# Patient Record
Sex: MALE | Race: WHITE | Employment: OTHER | URBAN - METROPOLITAN AREA
[De-identification: names, ages, dates, MRNs, and addresses within clinical notes are randomized per-mention and may not be internally consistent; named-entity substitution may affect disease eponyms.]

---

## 2019-04-18 ENCOUNTER — HISTORICAL (OUTPATIENT)
Dept: ADMINISTRATIVE | Facility: HOSPITAL | Age: 64
End: 2019-04-18

## 2019-04-18 LAB
ABS NEUT (OLG): 4.7 X10(3)/MCL (ref 2.1–9.2)
ALBUMIN SERPL-MCNC: 4.7 GM/DL (ref 3.4–5)
ALBUMIN/GLOB SERPL: 1.62 {RATIO} (ref 1.5–2.5)
ALP SERPL-CCNC: 61 UNIT/L (ref 38–126)
ALT SERPL-CCNC: 23 UNIT/L (ref 7–52)
AST SERPL-CCNC: 22 UNIT/L (ref 15–37)
BILIRUB SERPL-MCNC: 0.8 MG/DL (ref 0.2–1)
BILIRUBIN DIRECT+TOT PNL SERPL-MCNC: 0.2 MG/DL (ref 0–0.5)
BILIRUBIN DIRECT+TOT PNL SERPL-MCNC: 0.6 MG/DL
BUN SERPL-MCNC: 11 MG/DL (ref 7–18)
CALCIUM SERPL-MCNC: 9.6 MG/DL (ref 8.5–10)
CHLORIDE SERPL-SCNC: 105 MMOL/L (ref 98–107)
CHOLEST SERPL-MCNC: 227 MG/DL (ref 0–200)
CHOLEST/HDLC SERPL: 4.4 {RATIO}
CO2 SERPL-SCNC: 28 MMOL/L (ref 21–32)
CREAT SERPL-MCNC: 0.99 MG/DL (ref 0.6–1.3)
ERYTHROCYTE [DISTWIDTH] IN BLOOD BY AUTOMATED COUNT: 12.9 % (ref 11.5–17)
GLOBULIN SER-MCNC: 2.9 GM/DL (ref 1.2–3)
GLUCOSE SERPL-MCNC: 119 MG/DL (ref 74–106)
HCT VFR BLD AUTO: 45.2 % (ref 42–52)
HDLC SERPL-MCNC: 52 MG/DL (ref 35–60)
HGB BLD-MCNC: 15.9 GM/DL (ref 14–18)
LDLC SERPL CALC-MCNC: 142 MG/DL (ref 0–129)
LYMPHOCYTES # BLD AUTO: 2.3 X10(3)/MCL (ref 0.6–3.4)
LYMPHOCYTES NFR BLD AUTO: 30.3 % (ref 13–40)
MCH RBC QN AUTO: 31.1 PG (ref 27–31.2)
MCHC RBC AUTO-ENTMCNC: 35 GM/DL (ref 32–36)
MCV RBC AUTO: 88 FL (ref 80–94)
MONOCYTES # BLD AUTO: 0.6 X10(3)/MCL (ref 0.1–1.3)
MONOCYTES NFR BLD AUTO: 7.7 % (ref 0.1–24)
NEUTROPHILS NFR BLD AUTO: 62 % (ref 47–80)
PLATELET # BLD AUTO: 219 X10(3)/MCL (ref 130–400)
PMV BLD AUTO: 10 FL (ref 9.4–12.4)
POTASSIUM SERPL-SCNC: 4.2 MMOL/L (ref 3.5–5.1)
PROT SERPL-MCNC: 7.6 GM/DL (ref 6.4–8.2)
PSA SERPL-MCNC: 11.55 NG/ML (ref 0–4.5)
RBC # BLD AUTO: 5.11 X10(6)/MCL (ref 4.7–6.1)
SODIUM SERPL-SCNC: 140 MMOL/L (ref 136–145)
TRIGL SERPL-MCNC: 110 MG/DL (ref 30–150)
TSH SERPL-ACNC: 1.05 MIU/ML (ref 0.35–4.94)
VLDLC SERPL CALC-MCNC: 22 MG/DL
WBC # SPEC AUTO: 7.6 X10(3)/MCL (ref 4.5–11.5)

## 2021-06-01 ENCOUNTER — HISTORICAL (OUTPATIENT)
Dept: ADMINISTRATIVE | Facility: HOSPITAL | Age: 66
End: 2021-06-01

## 2022-04-07 ENCOUNTER — HISTORICAL (OUTPATIENT)
Dept: ADMINISTRATIVE | Facility: HOSPITAL | Age: 67
End: 2022-04-07

## 2022-04-24 VITALS
WEIGHT: 174.81 LBS | DIASTOLIC BLOOD PRESSURE: 88 MMHG | BODY MASS INDEX: 28.09 KG/M2 | HEIGHT: 66 IN | SYSTOLIC BLOOD PRESSURE: 164 MMHG

## 2022-05-01 NOTE — HISTORICAL [facility] CERNER
This is a historical note converted from [employer]. Formatting and pictures may have been removed.  Please reference [employer] for original formatting and attached multimedia. Chief Complaint  WELLNESS CPX FAST  History of Present Illness  64 year old WM presents fasting for annual wellness  PMH: HTN  ?  , Works for [employer]  No Tob, No EtOH  No exercise  Diet: No fried/fatty foods  ?  Colonoscopy (2017)- few polyps  ?  Previously on Losartan for HTN but patient states he hasnt taken med for 1-2 years because My BP is always normal at home.  Review of Systems  Constitutional:?no fever, fatigue, weakness  Eye:?no vision loss, eye redness, drainage, or pain  ENMT:?no sore throat, ear pain, sinus pain/congestion, nasal congestion/drainage  Respiratory:?no cough, no wheezing, no shortness of breath  Cardiovascular:?no chest pain, no palpitations, no edema  Gastrointestinal:?no nausea, vomiting, or diarrhea. No abdominal pain  Genitourinary:?no dysuria, no urinary frequency or urgency, no hematuria  Hema/Lymph:?no abnormal bruising or bleeding  Endocrine:?no heat or cold intolerance, no excessive thirst or excessive urination  Musculoskeletal:?no muscle or joint pain, no joint swelling  Integumentary:?no skin rash or abnormal lesion  Neurologic: no headache, no dizziness, no weakness or numbness  Physical Exam  Vitals & Measurements  T:?37? ?C (Oral)? HR:?68(Peripheral)? BP:?168/98?  HT:?167?cm? WT:?80.7?kg? BMI:?28.94?  General:?well-developed well-nourished in no acute distress  Eye: PERRLA, EOMI, clear conjunctiva, eyelids normal  HENT:?TMs/ear canals clear, oropharynx without erythema/exudate, oropharynx and nasal mucosal surfaces moist, no maxillary/frontal sinus tenderness to palpation  Neck: full range of motion, no thyromegaly or lymphadenopathy  Respiratory:?clear to auscultation bilaterally  Cardiovascular:?regular rate and rhythm without murmurs, gallops or rubs  Gastrointestinal:?soft, non-tender,  non-distended with normal bowel sounds, without masses to palpation  Genitourinary: no CVA tenderness to palpation  Musculoskeletal:?full range of motion of all extremities/spine without limitation or discomfort  Integumentary: no rashes or skin lesions present  Neurologic: cranial nerves intact, no signs of peripheral neurological deficit, motor/sensory function intact  Assessment/Plan  1.?Wellness examination?Z00.00  ?LABS: CBC, CMP, TSH, FLP  Ordered:  CBC w/ Auto Diff, Routine collect, 04/18/19 8:39:00 CDT, Blood, Order for future visit, Stop date 04/18/19 8:39:00 CDT, Lab Collect, Wellness examination, 04/18/19 8:39:00 CDT  Comprehensive Metabolic Panel, Now collect, 04/18/19 8:39:00 CDT, Blood, Order for future visit, Stop date 04/18/19 8:39:00 CDT, Lab Collect, Wellness examination, 04/18/19 8:39:00 CDT  Lab Collection Request, 04/18/19 8:39:00 CDT, [facility] - [facility], 04/18/19 8:39:00 CDT  Lipid Panel, Routine collect, 04/18/19 8:39:00 CDT, Blood, Order for future visit, Stop date 04/18/19 8:39:00 CDT, Lab Collect, Wellness examination, 04/18/19 8:39:00 CDT  Preventative Health Care Est 40-64 years [id number] PC, Wellness examination, [facility] - [facility], 04/18/19 8:39:00 CDT  Thyroid Stimulating Hormone, Routine collect, 04/18/19 8:39:00 CDT, Blood, Order for future visit, Stop date 04/18/19 8:39:00 CDT, Lab Collect, Wellness examination, 04/18/19 8:39:00 CDT  ?  2.?Depression?F32.9  ?  3.?HTN - Hypertension?I10  ?Advised to monitor BP twice daily at home x 2 weeks  ?Call us in 2 weeks with BP log to review. Will decide on whether to restart Losartan pending results  ?  4.?Hyperlipidemia?E78.5  ?  5.?Prostate cancer screening?Z12.5  Ordered:  Prostate Specific Antigen, Routine collect, 04/18/19 8:39:00 CDT, Blood, Order for future visit, Stop date 04/18/19 8:39:00 CDT, Lab Collect, Prostate cancer screening, 04/18/19 8:39:00 CDT  ?  Orders:  Clinic Follow-up PRN, 04/18/19 8:40:00 CDT, [facility] - [facility], Future Order    Problem List/Past Medical History  Ongoing  Depression  Enlarged prostate  HTN - Hypertension  Hyperlipidemia  IBS - Irritable bowel syndrome  Polyp  Historical  No qualifying data  Procedure/Surgical History  colonoscopy (08/02/2017)  Biopsy Prostate (None) (11/07/2016)  Excision of Prostate, Percutaneous Endoscopic Approach, Diagnostic (11/07/2016)  Unlisted procedure, male genital system (11/07/2016)  Colonoscopy (07/13/2010)  Biopsy of prostate   Medications  cannabidiol, 350 mg, Oral, BID  CoQ10 300 mg oral capsule, 300 mg= 1 cap(s), Oral, Daily  MagneBind 400 Rx oral tablet, 1 tab(s), Oral, Daily  multivitamin with minerals (Adult Tab), 1 tab(s), Oral, Daily  Allergies  No Known Allergies  Social History  Alcohol  Current, 1-2 times per month, 04/18/2019  Employment/School  Retired, 04/18/2019  Home/Environment  Lives with Spouse., 04/18/2019  Nutrition/Health  Regular, 04/18/2019  Substance Abuse  Never, 04/18/2019  Tobacco  Never (less than 100 in lifetime), N/A, 04/18/2019  Family History  Asthma.: Sister.Negative: Mother.  COPD (chronic obstructive pulmonary disease).: Father.  Cancer: Father.  Depression.: Mother.Negative: Father.  Diabetes mellitus 27-APR-2016 23:37:36<$>: Mother.  Diabetes mellitus type 2: Mother and Grandfather.  Hypertension.: Mother, Father and Brother.  Osteoarthritis: Brother.  Primary malignant neoplasm of prostate: Grandfather.  Thyroid disorder: Sister.  Health Maintenance  Health Maintenance  ???Pending?(in the next year)  ??? ??Due?  ??? ? ? ?ADL Screening due??04/18/19??and every 1??year(s)  ??? ? ? ?Alcohol Misuse Screening due??04/18/19??and every 1??year(s)  ??? ? ? ?Aspirin Therapy for CVD Prevention due??04/18/19??and every 1??year(s)  ??? ? ? ?COLONOSCOPY due??04/18/19??and every 5??year(s)  ??? ? ? ?Hypertension Management-Education due??04/18/19??and every 1??year(s)  ??? ? ? ?Hypertension Maintenance-Medication Prescribed due??04/18/19??and every 1??year(s)  ??? ? ?  ?Smoking Cessation due??04/18/19??Variable frequency  ??? ? ? ?Tetanus Vaccine due??04/18/19??and every 10??year(s)  ??? ? ? ?Zoster Vaccine due??04/18/19??and every 100??year(s)  ???Satisfied?(in the past 1 year)  ??? ??Satisfied?  ??? ? ? ?Blood Pressure Screening on??04/18/19.??Satisfied by Beronica Rosario LPN  ??? ? ? ?Body Mass Index Check on??04/18/19.??Satisfied by Beronica Rosario LPN  ??? ? ? ?Depression Screening on??04/18/19.??Satisfied by Beronica Rosario LPN  ??? ? ? ?Hypertension Management-Blood Pressure on??04/18/19.??Satisfied by Beronica Rosario LPN  ??? ? ? ?Obesity Screening on??04/18/19.??Satisfied by Beronica Rosario LPN  ?  ?

## 2023-01-18 ENCOUNTER — TELEPHONE (OUTPATIENT)
Dept: PRIMARY CARE CLINIC | Facility: CLINIC | Age: 68
End: 2023-01-18
Payer: MEDICARE

## 2023-01-18 NOTE — TELEPHONE ENCOUNTER
Spoke with pt's wife and discussed how no labs are ordered on new pts. Pt had no questions or concerns

## 2023-01-18 NOTE — TELEPHONE ENCOUNTER
----- Message from Melly Garcia sent at 1/18/2023  8:32 AM CST -----  Regarding: Lab orders  .Type:  Needs Medical Advice    Who Called: Pt's wife, Jenn  Symptoms (please be specific):   How long has patient had these symptoms:    Pharmacy name and phone #:    Would the patient rather a call back or a response via [facility]? Call back  Best Call Back Number: [phone]  Additional Information: Pt requesting labs prior to appt. Pls F/U

## 2023-04-05 PROBLEM — E78.5 DYSLIPIDEMIA: Chronic | Status: ACTIVE | Noted: 2023-04-05

## 2023-04-05 PROBLEM — F32.A DEPRESSIVE DISORDER: Chronic | Status: ACTIVE | Noted: 2023-04-05

## 2023-04-05 PROBLEM — I10 PRIMARY HYPERTENSION: Chronic | Status: ACTIVE | Noted: 2023-04-05

## 2023-04-05 PROBLEM — I10 HYPERTENSION: Status: ACTIVE | Noted: 2023-04-05

## 2023-04-05 PROBLEM — N40.0 BENIGN PROSTATIC HYPERPLASIA WITHOUT LOWER URINARY TRACT SYMPTOMS: Chronic | Status: ACTIVE | Noted: 2023-04-05

## 2023-04-05 PROBLEM — K58.9 IRRITABLE BOWEL SYNDROME: Status: ACTIVE | Noted: 2023-04-05

## 2023-04-05 PROBLEM — F32.A DEPRESSIVE DISORDER: Status: ACTIVE | Noted: 2023-04-05

## 2023-04-05 PROBLEM — K58.9 IRRITABLE BOWEL SYNDROME: Chronic | Status: ACTIVE | Noted: 2023-04-05

## 2023-04-05 PROBLEM — N40.0 BENIGN PROSTATIC HYPERPLASIA WITHOUT LOWER URINARY TRACT SYMPTOMS: Status: ACTIVE | Noted: 2023-04-05

## 2023-04-05 PROBLEM — E78.5 HYPERLIPIDEMIA: Status: ACTIVE | Noted: 2023-04-05

## 2023-04-06 ENCOUNTER — OFFICE VISIT (OUTPATIENT)
Dept: PRIMARY CARE CLINIC | Facility: CLINIC | Age: 68
End: 2023-04-06
Payer: COMMERCIAL

## 2023-04-06 VITALS
HEIGHT: 65 IN | OXYGEN SATURATION: 98 % | DIASTOLIC BLOOD PRESSURE: 81 MMHG | SYSTOLIC BLOOD PRESSURE: 113 MMHG | RESPIRATION RATE: 18 BRPM | BODY MASS INDEX: 29.66 KG/M2 | WEIGHT: 178 LBS | HEART RATE: 99 BPM

## 2023-04-06 DIAGNOSIS — Z76.89 ESTABLISHING CARE WITH NEW DOCTOR, ENCOUNTER FOR: Primary | ICD-10-CM

## 2023-04-06 DIAGNOSIS — Z00.00 WELLNESS EXAMINATION: ICD-10-CM

## 2023-04-06 DIAGNOSIS — I10 PRIMARY HYPERTENSION: Chronic | ICD-10-CM

## 2023-04-06 DIAGNOSIS — R97.20 ELEVATED PSA, BETWEEN 10 AND LESS THAN 20 NG/ML: Chronic | ICD-10-CM

## 2023-04-06 DIAGNOSIS — R79.9 ABNORMAL BLOOD CHEMISTRY: ICD-10-CM

## 2023-04-06 PROBLEM — K57.30 DIVERTICULOSIS OF LARGE INTESTINE WITHOUT PERFORATION OR ABSCESS WITHOUT BLEEDING: Chronic | Status: ACTIVE | Noted: 2022-10-20

## 2023-04-06 PROBLEM — Z86.010 PERSONAL HISTORY OF COLONIC POLYPS: Chronic | Status: ACTIVE | Noted: 2017-07-31

## 2023-04-06 PROBLEM — Z86.010 PERSONAL HISTORY OF COLONIC POLYPS: Status: ACTIVE | Noted: 2017-07-31

## 2023-04-06 PROBLEM — E78.5 DYSLIPIDEMIA: Chronic | Status: RESOLVED | Noted: 2023-04-05 | Resolved: 2023-04-06

## 2023-04-06 PROBLEM — Z86.0100 PERSONAL HISTORY OF COLONIC POLYPS: Chronic | Status: ACTIVE | Noted: 2017-07-31

## 2023-04-06 PROBLEM — K64.8 OTHER HEMORRHOIDS: Status: ACTIVE | Noted: 2017-08-02

## 2023-04-06 PROBLEM — K64.8 OTHER HEMORRHOIDS: Chronic | Status: ACTIVE | Noted: 2017-08-02

## 2023-04-06 PROBLEM — K57.30 DIVERTICULOSIS OF LARGE INTESTINE WITHOUT PERFORATION OR ABSCESS WITHOUT BLEEDING: Status: ACTIVE | Noted: 2022-10-20

## 2023-04-06 PROBLEM — Z86.0100 PERSONAL HISTORY OF COLONIC POLYPS: Status: ACTIVE | Noted: 2017-07-31

## 2023-04-06 PROBLEM — K58.9 IRRITABLE BOWEL SYNDROME: Chronic | Status: RESOLVED | Noted: 2023-04-05 | Resolved: 2023-04-06

## 2023-04-06 PROCEDURE — 99213 PR OFFICE/OUTPT VISIT, EST, LEVL III, 20-29 MIN: ICD-10-PCS | Mod: ,,, | Performed by: GENERAL PRACTICE

## 2023-04-06 PROCEDURE — 99213 OFFICE O/P EST LOW 20 MIN: CPT | Mod: ,,, | Performed by: GENERAL PRACTICE

## 2023-04-06 NOTE — PROGRESS NOTES
"Subjective:       Patient ID: Jd Nelson is a 67 y.o. male.    Chief Complaint: Establish Care (Left sided hip pain x 1 year and weakness)    Patient presents to the clinic to establish primary care.  Past medical, family, and social history is reviewed, as well as all chronic conditions, and medications prescribed to treat those conditions.      Review of Systems   Constitutional: Negative.  Negative for fatigue and fever.   HENT: Negative.  Negative for sore throat and trouble swallowing.    Eyes: Negative.  Negative for redness and visual disturbance.   Respiratory: Negative.  Negative for chest tightness and shortness of breath.    Cardiovascular: Negative.  Negative for chest pain.   Gastrointestinal: Negative.  Negative for abdominal pain, blood in stool and nausea.   Endocrine: Negative.  Negative for cold intolerance, heat intolerance and polyuria.   Genitourinary: Negative.    Musculoskeletal: Negative.  Negative for arthralgias, gait problem and joint swelling.   Integumentary:  Negative for rash. Negative.   Neurological: Negative.  Negative for dizziness, weakness and headaches.   Psychiatric/Behavioral: Negative.  Negative for agitation and dysphoric mood.        Objective:     Vitals:    04/06/23 1306   BP: 113/81   Pulse: 99   Resp: 18   SpO2: 98%   Weight: 80.7 kg (178 lb)   Height: 5' 5" (1.651 m)   Body mass index is 29.62 kg/m².     Physical Exam  Constitutional:       Appearance: Normal appearance.   HENT:      Head: Normocephalic.      Mouth/Throat:      Pharynx: Oropharynx is clear.   Eyes:      Conjunctiva/sclera: Conjunctivae normal.      Pupils: Pupils are equal, round, and reactive to light.   Cardiovascular:      Rate and Rhythm: Normal rate and regular rhythm.      Heart sounds: Normal heart sounds.   Pulmonary:      Effort: Pulmonary effort is normal.      Breath sounds: Normal breath sounds.   Abdominal:      General: Abdomen is flat.      Palpations: Abdomen is soft. "   Musculoskeletal:         General: Normal range of motion.      Cervical back: Neck supple.   Skin:     General: Skin is warm and dry.   Neurological:      General: No focal deficit present.      Mental Status: He is oriented to person, place, and time.   Psychiatric:         Mood and Affect: Mood normal.         Behavior: Behavior normal.         Thought Content: Thought content normal.         Judgment: Judgment normal.         Lab Results   Component Value Date    .0 04/18/2019    K 4.2 04/18/2019    CO2 28.0 04/18/2019    BUN 11.0 04/18/2019    CREATININE 0.99 04/18/2019    CALCIUM 9.6 04/18/2019    ALBUMIN 4.7 04/18/2019    BILITOT 0.8 04/18/2019    ALKPHOS 61.0 04/18/2019    AST 22.0 04/18/2019    ALT 23.0 04/18/2019    EGFRNONAA >60 04/18/2019     Lab Results   Component Value Date    WBC 7.6 04/18/2019    RBC 5.11 04/18/2019    HGB 15.9 04/18/2019    HCT 45.2 04/18/2019    MCV 88 04/18/2019    RDW 12.9 04/18/2019     04/18/2019      Lab Results   Component Value Date    CHOL 227.0 (H) 04/18/2019    TRIG 110.0 04/18/2019    HDL 52.0 04/18/2019    .0 (H) 04/18/2019    TOTALCHOLEST 4.4 04/18/2019     Lab Results   Component Value Date    TSH 1.050 04/18/2019     No results found for: HGBA1C, ESTIMATEDAVG     Lab Results   Component Value Date    PSA 11.55 (H) 04/18/2019         Assessment:     Problem List Items Addressed This Visit          Cardiac/Vascular    Primary hypertension (Chronic)    Current Assessment & Plan     Controlled without prescription medication.              Renal/    Elevated PSA, between 10 and less than 20 ng/ml (Chronic)    Current Assessment & Plan     Patient sees Dr. St, his urologist on a regular basis.            Other Visit Diagnoses       Establishing care with new doctor, encounter for    -  Primary    Patient's medical care has been established in this clinic.  All issues addressed, all questions answered,  with appropriate scheduling of follow-up  care.    Wellness examination        Relevant Orders    Lipid Panel    Comprehensive Metabolic Panel    Hemoglobin A1C    Hepatitis C Antibody    Abnormal blood chemistry        Relevant Orders    Hemoglobin A1C                 Plan:             Follow up in about 3 months (around 7/19/2023).

## 2023-07-19 DIAGNOSIS — R79.9 ABNORMAL BLOOD CHEMISTRY: ICD-10-CM

## 2023-07-19 DIAGNOSIS — R97.20 ELEVATED PSA, BETWEEN 10 AND LESS THAN 20 NG/ML: Chronic | ICD-10-CM

## 2023-07-19 DIAGNOSIS — Z00.00 WELLNESS EXAMINATION: Primary | ICD-10-CM

## 2023-07-20 ENCOUNTER — CLINICAL SUPPORT (OUTPATIENT)
Dept: PRIMARY CARE CLINIC | Facility: CLINIC | Age: 68
End: 2023-07-20
Payer: COMMERCIAL

## 2023-07-20 DIAGNOSIS — R79.9 ABNORMAL BLOOD CHEMISTRY: ICD-10-CM

## 2023-07-20 DIAGNOSIS — Z00.00 WELLNESS EXAMINATION: ICD-10-CM

## 2023-07-20 DIAGNOSIS — I10 PRIMARY HYPERTENSION: Primary | Chronic | ICD-10-CM

## 2023-07-20 LAB
ALBUMIN SERPL-MCNC: 4.4 G/DL (ref 3.4–4.8)
ALBUMIN/GLOB SERPL: 1.5 RATIO (ref 1.1–2)
ALP SERPL-CCNC: 55 UNIT/L (ref 40–150)
ALT SERPL-CCNC: 30 UNIT/L (ref 0–55)
AST SERPL-CCNC: 27 UNIT/L (ref 5–34)
BILIRUBIN DIRECT+TOT PNL SERPL-MCNC: 0.6 MG/DL
BUN SERPL-MCNC: 12.6 MG/DL (ref 8.4–25.7)
CALCIUM SERPL-MCNC: 9.8 MG/DL (ref 8.8–10)
CHLORIDE SERPL-SCNC: 106 MMOL/L (ref 98–107)
CHOLEST SERPL-MCNC: 227 MG/DL
CHOLEST/HDLC SERPL: 4 {RATIO} (ref 0–5)
CO2 SERPL-SCNC: 23 MMOL/L (ref 23–31)
CREAT SERPL-MCNC: 0.94 MG/DL (ref 0.73–1.18)
EST. AVERAGE GLUCOSE BLD GHB EST-MCNC: 105.4 MG/DL
GFR SERPLBLD CREATININE-BSD FMLA CKD-EPI: >60 MLS/MIN/1.73/M2
GLOBULIN SER-MCNC: 2.9 GM/DL (ref 2.4–3.5)
GLUCOSE SERPL-MCNC: 108 MG/DL (ref 82–115)
HBA1C MFR BLD: 5.3 %
HDLC SERPL-MCNC: 51 MG/DL (ref 35–60)
LDLC SERPL CALC-MCNC: 151 MG/DL (ref 50–140)
POTASSIUM SERPL-SCNC: 4.3 MMOL/L (ref 3.5–5.1)
PROT SERPL-MCNC: 7.3 GM/DL (ref 5.8–7.6)
SODIUM SERPL-SCNC: 139 MMOL/L (ref 136–145)
TRIGL SERPL-MCNC: 126 MG/DL (ref 34–140)
VLDLC SERPL CALC-MCNC: 25 MG/DL

## 2023-07-20 PROCEDURE — 36415 COLL VENOUS BLD VENIPUNCTURE: CPT | Mod: ,,, | Performed by: GENERAL PRACTICE

## 2023-07-20 PROCEDURE — 80053 COMPREHEN METABOLIC PANEL: CPT | Performed by: GENERAL PRACTICE

## 2023-07-20 PROCEDURE — 80061 LIPID PANEL: CPT | Performed by: GENERAL PRACTICE

## 2023-07-20 PROCEDURE — 86803 HEPATITIS C AB TEST: CPT | Performed by: GENERAL PRACTICE

## 2023-07-20 PROCEDURE — 36415 PR COLLECTION VENOUS BLOOD,VENIPUNCTURE: ICD-10-PCS | Mod: ,,, | Performed by: GENERAL PRACTICE

## 2023-07-20 PROCEDURE — 83036 HEMOGLOBIN GLYCOSYLATED A1C: CPT | Performed by: GENERAL PRACTICE

## 2023-07-20 PROCEDURE — 36415 COLL VENOUS BLD VENIPUNCTURE: CPT

## 2023-07-21 LAB — HCV AB SERPL QL IA: NONREACTIVE

## 2023-07-26 ENCOUNTER — OFFICE VISIT (OUTPATIENT)
Dept: PRIMARY CARE CLINIC | Facility: CLINIC | Age: 68
End: 2023-07-26
Payer: COMMERCIAL

## 2023-07-26 VITALS
DIASTOLIC BLOOD PRESSURE: 86 MMHG | WEIGHT: 174 LBS | OXYGEN SATURATION: 97 % | BODY MASS INDEX: 28.99 KG/M2 | SYSTOLIC BLOOD PRESSURE: 138 MMHG | HEIGHT: 65 IN | RESPIRATION RATE: 18 BRPM | HEART RATE: 101 BPM

## 2023-07-26 DIAGNOSIS — R97.20 ELEVATED PSA, BETWEEN 10 AND LESS THAN 20 NG/ML: Chronic | ICD-10-CM

## 2023-07-26 DIAGNOSIS — Z00.00 MEDICARE ANNUAL WELLNESS VISIT, SUBSEQUENT: Primary | ICD-10-CM

## 2023-07-26 DIAGNOSIS — N40.0 BENIGN PROSTATIC HYPERPLASIA WITHOUT LOWER URINARY TRACT SYMPTOMS: Chronic | ICD-10-CM

## 2023-07-26 DIAGNOSIS — R79.9 ABNORMAL BLOOD CHEMISTRY: ICD-10-CM

## 2023-07-26 DIAGNOSIS — E78.5 DYSLIPIDEMIA: Chronic | ICD-10-CM

## 2023-07-26 PROBLEM — I10 PRIMARY HYPERTENSION: Chronic | Status: RESOLVED | Noted: 2023-04-05 | Resolved: 2023-07-26

## 2023-07-26 PROCEDURE — G0439 PR MEDICARE ANNUAL WELLNESS SUBSEQUENT VISIT: ICD-10-PCS | Mod: ,,, | Performed by: GENERAL PRACTICE

## 2023-07-26 PROCEDURE — G0439 PPPS, SUBSEQ VISIT: HCPCS | Mod: ,,, | Performed by: GENERAL PRACTICE

## 2023-07-26 NOTE — PROGRESS NOTES
Patient ID: [id number]     Chief Complaint: Annual Exam      HPI:     Jd Nelson is a 68 y.o. male here today for a Medicare Wellness. No other complaints today.       ----------------------------  Dyslipidemia  Irritable bowel syndrome  Primary hypertension     History reviewed. No pertinent surgical history.    Review of patient's allergies indicates:  No Known Allergies    No outpatient medications have been marked as taking for the 7/26/23 encounter (Office Visit) with Galo Hussein MD.       Social History     Socioeconomic History    Marital status:    Tobacco Use    Smoking status: Never    Smokeless tobacco: Never        History reviewed. No pertinent family history.     Patient Care Team:  Galo Hussein MD as PCP - General (Family Medicine)  Joao St MD as Consulting Physician (Urology)  Enoc Gaytan MD as Consulting Physician (Gastroenterology)     Opioid Screening: Patient medication list reviewed, patient is not taking prescription opioids. Patient is not using additional opioids than prescribed. Patient is at low risk of substance abuse based on this opioid use history.       Subjective:     Review of Systems   Constitutional: Negative.  Negative for malaise/fatigue and weight loss.   HENT: Negative.     Eyes: Negative.    Respiratory: Negative.  Negative for shortness of breath.    Cardiovascular: Negative.  Negative for chest pain.   Gastrointestinal: Negative.    Genitourinary: Negative.    Musculoskeletal: Negative.    Skin: Negative.    Neurological: Negative.  Negative for focal weakness, weakness and headaches.   Endo/Heme/Allergies: Negative.    Psychiatric/Behavioral: Negative.  Negative for depression and memory loss. The patient is not nervous/anxious.        Patient Reported Health Risk Assessment  What [age] your age?: 65-69  Are you male or female?: Male  During the past four weeks, how much have you been bothered by emotional problems such as feeling  anxious, depressed, irritable, sad, or downhearted and blue?: Not at all  During the past five weeks, has your physical and/or emotional health limited your social activities with family, friends, neighbors, or groups?: Not at all  During the past four weeks, how much bodily pain have you generally had?: Very mild pain  During the past four weeks, was someone available to help if you needed and wanted help?: Yes, as much as I wanted  During the past four weeks, what was the hardest physical activity you could do for at least two minutes?: Light  Can you get to places out of walking distance without help?  (For example, can you travel alone on buses or taxis, or drive your own car?): Yes  Can you go shopping for groceries or clothes without someone's help?: Yes  Can you prepare your own meals?: Yes  Can you do your own housework without help?: Yes  Because of any health problems, do you need the help of another person with your personal care needs such as eating, bathing, dressing, or getting around the house?: No  Can you handle your own money without help?: Yes  During the past four weeks, how would you rate your health in general?: Good  How have things been going for you during the past four weeks?: Pretty well  Are you having difficulties driving your car?: No  Do you always fasten your seat belt when you are in a car?: Yes, usually  How often in the past four weeks have you been bothered by falling or dizzy when standing up?: Never  How often in the past four weeks have you been bothered by sexual problems?: Never  How often in the past four weeks have you been bothered by trouble eating well?: Never  How often in the past four weeks have you been bothered by teeth or denture problems?: Never  How often in the past four weeks have you been bothered with problems using the telephone?: Never  How often in the past four weeks have you been bothered by tiredness or fatigue?: Never  Have you fallen two or more times  "in the past year?: No  Are you afraid of falling?: No  Are you a smoker?: No  During the past four weeks, how many drinks of wine, beer, or other alcoholic beverages did you have?: No alcohol at all  Do you exercise for about 20 minutes three or more days a week?: No, I usually do not exercise this much  Have you been given any information to help you with hazards in your house that might hurt you?: No  Have you been given any information to help you with keeping track of your medications?: No  How often do you have trouble taking medicines the way you've been told to take them?: I always take them as prescribed  How confident are you that you can control and manage most of your health problems?: Very confident  What is your race? (Check all that apply.):     Objective:     /86   Pulse 101   Resp 18   Ht 5' 5" (1.651 m)   Wt 78.9 kg (174 lb)   SpO2 97%   BMI 28.96 kg/m²     Physical Exam  Constitutional:       Appearance: Normal appearance.   HENT:      Head: Normocephalic.      Mouth/Throat:      Mouth: Mucous membranes are moist.      Pharynx: Oropharynx is clear.   Eyes:      Conjunctiva/sclera: Conjunctivae normal.      Pupils: Pupils are equal, round, and reactive to light.   Cardiovascular:      Rate and Rhythm: Normal rate and regular rhythm.      Heart sounds: Normal heart sounds.   Pulmonary:      Effort: Pulmonary effort is normal.      Breath sounds: Normal breath sounds.   Abdominal:      General: Abdomen is flat.      Palpations: Abdomen is soft.   Musculoskeletal:         General: Normal range of motion.      Cervical back: Neck supple.   Skin:     General: Skin is warm and dry.   Neurological:      General: No focal deficit present.      Mental Status: He is alert and oriented to person, place, and time.   Psychiatric:         Mood and Affect: Mood normal.         Behavior: Behavior normal.         Thought Content: Thought content normal.         Judgment: Judgment normal. "       Lab Results   Component Value Date     07/20/2023    K 4.3 07/20/2023    CO2 23 07/20/2023    BUN 12.6 07/20/2023    CREATININE 0.94 07/20/2023    CALCIUM 9.8 07/20/2023    ALBUMIN 4.4 07/20/2023    BILITOT 0.6 07/20/2023    ALKPHOS 55 07/20/2023    AST 27 07/20/2023    ALT 30 07/20/2023    EGFRNORACEVR >60 07/20/2023     Lab Results   Component Value Date    WBC 7.6 04/18/2019    RBC 5.11 04/18/2019    HGB 15.9 04/18/2019    HCT 45.2 04/18/2019    MCV 88 04/18/2019    RDW 12.9 04/18/2019     04/18/2019      Lab Results   Component Value Date    CHOL 227 (H) 07/20/2023    TRIG 126 07/20/2023    HDL 51 07/20/2023    .00 (H) 07/20/2023    TOTALCHOLEST 4 07/20/2023     Lab Results   Component Value Date    TSH 1.050 04/18/2019     Lab Results   Component Value Date    HGBA1C 5.3 07/20/2023        Lab Results   Component Value Date    PSA 11.55 (H) 04/18/2019         No flowsheet data found.  Fall Risk Assessment - Outpatient 4/6/2023   Mobility Status Ambulatory   Number of falls 0   Identified as fall risk 0           Depression Screening  Over the past two weeks, has the patient felt down, depressed, or hopeless?: No  Over the past two weeks, has the patient felt little interest or pleasure in doing things?: No  Functional Ability/Safety Screening  Was the patient's timed Up & Go test unsteady or longer than 30 seconds?: No  Does the patient need help with phone, transportation, shopping, preparing meals, housework, laundry, meds, or managing money?: No  Does the patient's home have rugs in the hallway, lack grab bars in the bathroom, lack handrails on the stairs or have poor lighting?: No  Have you noticed any hearing difficulties?: No  Cognitive Function (Assessed through direct observation with due consideration of information obtained by way of patient reports and/or concerns raised by family, friends, caretakers, or others)    Does the patient repeat questions/statements in the same  day?: No  Does the patient have trouble remembering the date, year, and time?: No  Does the patient have difficulty managing finances?: No  Does the patient have a decreased sense of direction?: No  Assessment:       ICD-10-CM ICD-9-CM   1. Medicare annual wellness visit, subsequent  Z00.00 V70.0   2. Dyslipidemia  E78.5 272.4   3. Benign prostatic hyperplasia without lower urinary tract symptoms  N40.0 600.00   4. Elevated PSA, between 10 and less than 20 ng/ml  R97.20 790.93   5. Abnormal blood chemistry  R79.9 790.6        Plan:     Medicare Annual Wellness and Personalized Prevention Plan:   Fall Risk + Home Safety + Hearing Impairment + Depression Screen + Cognitive Impairment Screen + Health Risk Assessment all reviewed.       Health Maintenance Topics with due status: Not Due       Topic Last Completion Date    Colorectal Cancer Screening 10/20/2022    Hemoglobin A1c (Diabetic Prevention Screening) 07/20/2023    Lipid Panel 07/20/2023    Influenza Vaccine Not Due      The patient's Health Maintenance was reviewed and the following appears to be due at this time:   There are no preventive care reminders to display for this patient.      1. Medicare annual wellness visit, subsequent  Comments:  Overall health status was reviewed.  Good health habits reinforced.  Annual wellness exams recommended.  Orders:  -     Comprehensive Metabolic Panel; Future; Expected date: 07/26/2024  -     CBC Auto Differential; Future; Expected date: 07/26/2024  -     Lipid Panel; Future; Expected date: 07/26/2024  -     Hemoglobin A1C; Future; Expected date: 07/26/2024  -     TSH; Future; Expected date: 07/26/2024    2. Dyslipidemia  Assessment & Plan:  Component Ref Range & Units 6 d ago 4 yr ago   Cholesterol Total <=200 mg/dL 227 High   227.0 High  R    HDL Cholesterol 35 - 60 mg/dL 51  52.0 R    Triglyceride 34 - 140 mg/dL 126  110.0 R    Cholesterol/HDL Ratio 0 - 5 4  4.4 R    Very Low Density Lipoprotein  25  22.0    LDL  Cholesterol 50.00 - 140.00 mg/dL 151.00 High   142.0 High  R     Consume a diet low in saturated fats, (fats that are solid at room temperature such as animal fat) and trans fats, using healthy fats if necessary, olive oil and canola oil are 2 examples.  Increasing daily fiber intake can be very important in controlling cholesterol elevation as well.  Weight loss, especially weight loss associated with exercise can significantly lower lipid levels.  During future visits, depending on response to dietary changes, medication or change in dosage if already on lipid lowering medications may be considered if lipid levels continue to be significantly elevated.    Orders:  -     Lipid Panel; Future; Expected date: 07/26/2024  -     Hemoglobin A1C; Future; Expected date: 07/26/2024    3. Benign prostatic hyperplasia without lower urinary tract symptoms  Assessment & Plan:  He does see a urologist, Dr. St for this condition.      4. Elevated PSA, between 10 and less than 20 ng/ml  Assessment & Plan:  He sees a urologist for monitoring of this condition.      5. Abnormal blood chemistry  -     CBC Auto Differential; Future; Expected date: 07/26/2024  -     Hemoglobin A1C; Future; Expected date: 07/26/2024         Advance Care Planning   I attest to discussing Advance Care Planning with patient and/or family member.  Education was provided including the importance of the Health Care Power of , Advance Directives, and/or [place] documentation.  The patient expressed understanding to the importance of this information and discussion.         No current outpatient medications     Follow up in about 53 weeks (around 7/31/2024). In addition to their scheduled follow up, the patient has also been instructed to follow up on as needed basis.

## 2023-07-26 NOTE — ASSESSMENT & PLAN NOTE
Component Ref Range & Units 6 d ago 4 yr ago   Cholesterol Total <=200 mg/dL 227 High   227.0 High  R    HDL Cholesterol 35 - 60 mg/dL 51  52.0 R    Triglyceride 34 - 140 mg/dL 126  110.0 R    Cholesterol/HDL Ratio 0 - 5 4  4.4 R    Very Low Density Lipoprotein  25  22.0    LDL Cholesterol 50.00 - 140.00 mg/dL 151.00 High   142.0 High  R     Consume a diet low in saturated fats, (fats that are solid at room temperature such as animal fat) and trans fats, using healthy fats if necessary, olive oil and canola oil are 2 examples.  Increasing daily fiber intake can be very important in controlling cholesterol elevation as well.  Weight loss, especially weight loss associated with exercise can significantly lower lipid levels.  During future visits, depending on response to dietary changes, medication or change in dosage if already on lipid lowering medications may be considered if lipid levels continue to be significantly elevated.

## 2023-09-07 ENCOUNTER — PATIENT MESSAGE (OUTPATIENT)
Dept: RESEARCH | Facility: HOSPITAL | Age: 68
End: 2023-09-07
Payer: COMMERCIAL

## 2024-07-26 ENCOUNTER — CLINICAL SUPPORT (OUTPATIENT)
Dept: PRIMARY CARE CLINIC | Facility: CLINIC | Age: 69
End: 2024-07-26
Payer: MEDICARE

## 2024-07-26 DIAGNOSIS — R79.9 ABNORMAL BLOOD CHEMISTRY: ICD-10-CM

## 2024-07-26 DIAGNOSIS — E78.5 DYSLIPIDEMIA: Chronic | ICD-10-CM

## 2024-07-26 DIAGNOSIS — Z00.00 MEDICARE ANNUAL WELLNESS VISIT, SUBSEQUENT: ICD-10-CM

## 2024-07-26 LAB
ABS NEUT (OLG): 8.5 X10(3)/MCL (ref 2.1–9.2)
ALBUMIN SERPL-MCNC: 4.2 G/DL (ref 3.4–4.8)
ALBUMIN/GLOB SERPL: 1.4 RATIO (ref 1.1–2)
ALP SERPL-CCNC: 64 UNIT/L (ref 40–150)
ALT SERPL-CCNC: 16 UNIT/L (ref 0–55)
ANION GAP SERPL CALC-SCNC: 7 MEQ/L
AST SERPL-CCNC: 17 UNIT/L (ref 5–34)
BILIRUB SERPL-MCNC: 0.8 MG/DL
BUN SERPL-MCNC: 12.4 MG/DL (ref 8.4–25.7)
CALCIUM SERPL-MCNC: 9.9 MG/DL (ref 8.8–10)
CHLORIDE SERPL-SCNC: 106 MMOL/L (ref 98–107)
CHOLEST SERPL-MCNC: 238 MG/DL
CHOLEST/HDLC SERPL: 5 {RATIO} (ref 0–5)
CO2 SERPL-SCNC: 27 MMOL/L (ref 23–31)
CREAT SERPL-MCNC: 1.05 MG/DL (ref 0.73–1.18)
CREAT/UREA NIT SERPL: 12
EOSINOPHIL NFR BLD MANUAL: 0.77 X10(3)/MCL (ref 0–0.9)
EOSINOPHIL NFR BLD MANUAL: 4 %
ERYTHROCYTE [DISTWIDTH] IN BLOOD BY AUTOMATED COUNT: 13.7 % (ref 11.5–17)
EST. AVERAGE GLUCOSE BLD GHB EST-MCNC: 108.3 MG/DL
GFR SERPLBLD CREATININE-BSD FMLA CKD-EPI: >60 ML/MIN/1.73/M2
GLOBULIN SER-MCNC: 3 GM/DL (ref 2.4–3.5)
GLUCOSE SERPL-MCNC: 103 MG/DL (ref 82–115)
HBA1C MFR BLD: 5.4 %
HCT VFR BLD AUTO: 46.3 % (ref 42–52)
HDLC SERPL-MCNC: 51 MG/DL (ref 35–60)
HGB BLD-MCNC: 15.2 G/DL (ref 14–18)
INSTRUMENT WBC (OLG): 19.31 X10(3)/MCL
LDLC SERPL CALC-MCNC: 161 MG/DL (ref 50–140)
LYMPHOCYTES NFR BLD MANUAL: 49 %
LYMPHOCYTES NFR BLD MANUAL: 9.46 X10(3)/MCL
MCH RBC QN AUTO: 30.2 PG (ref 27–31)
MCHC RBC AUTO-ENTMCNC: 32.8 G/DL (ref 33–36)
MCV RBC AUTO: 91.9 FL (ref 80–94)
MONOCYTES NFR BLD MANUAL: 0.77 X10(3)/MCL (ref 0.1–1.3)
MONOCYTES NFR BLD MANUAL: 4 %
NEUTROPHILS NFR BLD MANUAL: 44 %
NRBC BLD AUTO-RTO: 0 %
PLATELET # BLD AUTO: 228 X10(3)/MCL (ref 130–400)
PLATELET # BLD EST: NORMAL 10*3/UL
PMV BLD AUTO: 10.5 FL (ref 7.4–10.4)
POTASSIUM SERPL-SCNC: 4 MMOL/L (ref 3.5–5.1)
PROT SERPL-MCNC: 7.2 GM/DL (ref 5.8–7.6)
RBC # BLD AUTO: 5.04 X10(6)/MCL (ref 4.7–6.1)
RBC MORPH BLD: NORMAL
SODIUM SERPL-SCNC: 140 MMOL/L (ref 136–145)
TRIGL SERPL-MCNC: 130 MG/DL (ref 34–140)
TSH SERPL-ACNC: 1.18 UIU/ML (ref 0.35–4.94)
VLDLC SERPL CALC-MCNC: 26 MG/DL
WBC # BLD AUTO: 19.31 X10(3)/MCL (ref 4.5–11.5)

## 2024-07-26 PROCEDURE — 85027 COMPLETE CBC AUTOMATED: CPT | Performed by: GENERAL PRACTICE

## 2024-07-26 PROCEDURE — 84443 ASSAY THYROID STIM HORMONE: CPT | Performed by: GENERAL PRACTICE

## 2024-07-26 PROCEDURE — 36415 COLL VENOUS BLD VENIPUNCTURE: CPT

## 2024-07-26 PROCEDURE — 80053 COMPREHEN METABOLIC PANEL: CPT | Performed by: GENERAL PRACTICE

## 2024-07-26 PROCEDURE — 80061 LIPID PANEL: CPT | Performed by: GENERAL PRACTICE

## 2024-07-26 PROCEDURE — 83036 HEMOGLOBIN GLYCOSYLATED A1C: CPT | Performed by: GENERAL PRACTICE

## 2024-07-26 PROCEDURE — 85025 COMPLETE CBC W/AUTO DIFF WBC: CPT | Performed by: GENERAL PRACTICE

## 2024-07-26 PROCEDURE — 85007 BL SMEAR W/DIFF WBC COUNT: CPT | Performed by: GENERAL PRACTICE

## 2024-07-30 PROBLEM — D72.829 LEUKOCYTOSIS: Chronic | Status: ACTIVE | Noted: 2024-07-30

## 2024-07-30 PROBLEM — D72.820 LYMPHOCYTOSIS: Chronic | Status: ACTIVE | Noted: 2024-07-30

## 2024-07-30 NOTE — PROGRESS NOTES
Subjective:       Patient ID: Jd Nelson is a 69 y.o. male.    Chief Complaint: No chief complaint on file.    Patient presents to the clinic today for wellness examination.  Current and past medical history reviewed  Pertinent family and social history reviewed    Colorectal cancer screening:  CRC screening reviewed  Prostate CA screening:  Prostate CA screening reviewed  Vaccinations due:  All vaccinations due and/or desired have been addressed and given.    No complaints today.        Review of Systems   Constitutional: Negative.  Negative for fatigue and fever.   HENT: Negative.  Negative for sore throat and trouble swallowing.    Eyes: Negative.  Negative for redness and visual disturbance.   Respiratory: Negative.  Negative for chest tightness and shortness of breath.    Cardiovascular: Negative.  Negative for chest pain.   Gastrointestinal: Negative.  Negative for abdominal pain, blood in stool and nausea.   Endocrine: Negative.  Negative for cold intolerance, heat intolerance and polyuria.   Genitourinary: Negative.    Musculoskeletal: Negative.  Negative for arthralgias, gait problem and joint swelling.   Integumentary:  Negative for rash. Negative.   Neurological: Negative.  Negative for dizziness, weakness and headaches.   Psychiatric/Behavioral: Negative.  Negative for agitation and dysphoric mood.            Patient Care Team:  Galo Hussein MD as PCP - General (Family Medicine)  Joao St MD as Consulting Physician (Urology)  Enoc Gaytan MD as Consulting Physician (Gastroenterology)  Objective:   There were no vitals taken for this visit.     Physical Exam  Constitutional:       Appearance: Normal appearance.   HENT:      Head: Normocephalic.      Mouth/Throat:      Mouth: Mucous membranes are moist.      Pharynx: Oropharynx is clear.   Eyes:      Conjunctiva/sclera: Conjunctivae normal.      Pupils: Pupils are equal, round, and reactive to light.   Cardiovascular:       Rate and Rhythm: Normal rate and regular rhythm.      Heart sounds: Normal heart sounds.   Pulmonary:      Effort: Pulmonary effort is normal.      Breath sounds: Normal breath sounds.   Abdominal:      General: Abdomen is flat.      Palpations: Abdomen is soft.   Musculoskeletal:         General: Normal range of motion.      Cervical back: Neck supple.   Skin:     General: Skin is warm and dry.   Neurological:      General: No focal deficit present.      Mental Status: He is alert and oriented to person, place, and time.   Psychiatric:         Mood and Affect: Mood normal.         Behavior: Behavior normal.         Thought Content: Thought content normal.         Judgment: Judgment normal.           Lab Results   Component Value Date     07/26/2024    K 4.0 07/26/2024     07/26/2024    CO2 27 07/26/2024    BUN 12.4 07/26/2024    CREATININE 1.05 07/26/2024    CALCIUM 9.9 07/26/2024    ALBUMIN 4.2 07/26/2024    BILITOT 0.8 07/26/2024    ALKPHOS 64 07/26/2024    AST 17 07/26/2024    ALT 16 07/26/2024    EGFRNORACEVR >60 07/26/2024     Lab Results   Component Value Date    WBC 19.31 (H) 07/26/2024    WBC 19.31 07/26/2024    RBC 5.04 07/26/2024    HGB 15.2 07/26/2024    HCT 46.3 07/26/2024    MCV 91.9 07/26/2024    RDW 13.7 07/26/2024     07/26/2024      Lab Results   Component Value Date    CHOL 238 (H) 07/26/2024    TRIG 130 07/26/2024    HDL 51 07/26/2024    .00 (H) 07/26/2024    TOTALCHOLEST 5 07/26/2024     Lab Results   Component Value Date    TSH 1.179 07/26/2024     Lab Results   Component Value Date    HGBA1C 5.4 07/26/2024        Lab Results   Component Value Date    PSA 11.55 (H) 04/18/2019         Assessment:       ICD-10-CM ICD-9-CM   1. Medicare annual wellness visit, subsequent  Z00.00 V70.0   2. Dyslipidemia  E78.5 272.4   3. Screening for prostate cancer  Z12.5 V76.44   4. Elevated PSA, between 10 and less than 20 ng/ml  R97.20 790.93   5. Lymphocytosis  D72.820 288.61       No  current outpatient medications  Plan:     Problem List Items Addressed This Visit          Cardiac/Vascular    Dyslipidemia (Chronic)    Overview     Dyslipidemia is being treated using lifestyle modification only.  Patient is not being prescribed lipid-lowering medication.  As discussed, we will continue to monitor this, and may ultimately choose to prescribe medication if this becomes difficult to control utilizing lifestyle modification only.            Renal/    Elevated PSA, between 10 and less than 20 ng/ml (Chronic)    Overview     Sees Urology, Dr. St.            Oncology    Lymphocytosis (Chronic)    Overview     Unknown etiology, could be clinically significant.      4 d ago 5 yr ago     WBC 4.50 - 11.50 x10(3)/mcL 19.31 High  7.6 R   RBC 4.70 - 6.10 x10(6)/mcL 5.04 5.11   Hgb 14.0 - 18.0 g/dL 15.2 15.9 R   Hct 42.0 - 52.0 % 46.3 45.2   MCV 80.0 - 94.0 fL 91.9 88 R   MCH 27.0 - 31.0 pg 30.2 31.1 R   MCHC 33.0 - 36.0 g/dL 32.8 Low  35 R   RDW 11.5 - 17.0 % 13.7 12.9   Platelet 130 - 400 x10(3)/mcL 228 219   MPV 7.4 - 10.4 fL 10.5 High  10.0 R   NRBC% % 0.0              Component Ref Range & Units 4 d ago  (7/26/24) 4 d ago  (7/26/24) 5 yr ago  (4/18/19) 5 yr ago  (4/18/19)   WBC x10(3)/mcL 19.31 19.31 High  R 7.6 R    Neutrophils % % 44      Lymphs % % 49      Monocytes % % 4   7.7 R   Eosinophils % % 4      Neutrophils Abs 2.1 - 9.2 x10(3)/mcL 8.4964  4.7    Lymphs Abs 0.6 - 4.6 x10(3)/mcL 9.4619 High    2.3 R   Monocytes Abs 0.1 - 1.3 x10(3)/mcL 0.7724   0.6   Eosinophils Abs 0 - 0.9 x10(3)/mcL 0.7724      Platelets Normal, Adequate Normal      RBC Morph Normal Normal               Numerous small to medium size lymphocytes with compact chromatin pattern, mildly irregular nuclei, small amount of azurophilic cytoplasm and occasional nucleoli are present. Innumerable smudge cells are noted. These findings are suspicious for a chronic lymphoproliferative disorder. A flow cytometry analysis of the  lymphoid population is recommended if not already performed. The red blood cells, granulocytes, monocytes and platelets are unremarkable.             Other Visit Diagnoses       Medicare annual wellness visit, subsequent    -  Primary    Overall health status was reviewed.  Good health habits reinforced.  Annual wellness exams recommended.    Screening for prostate cancer        Sees Dr. St for prostate cancer screening.                    No follow-ups on file.

## 2024-07-31 ENCOUNTER — OFFICE VISIT (OUTPATIENT)
Dept: PRIMARY CARE CLINIC | Facility: CLINIC | Age: 69
End: 2024-07-31
Payer: MEDICARE

## 2024-07-31 VITALS
BODY MASS INDEX: 29.16 KG/M2 | OXYGEN SATURATION: 99 % | HEART RATE: 100 BPM | HEIGHT: 65 IN | RESPIRATION RATE: 18 BRPM | SYSTOLIC BLOOD PRESSURE: 104 MMHG | WEIGHT: 175 LBS | DIASTOLIC BLOOD PRESSURE: 73 MMHG

## 2024-07-31 DIAGNOSIS — R79.9 ABNORMAL BLOOD CHEMISTRY: ICD-10-CM

## 2024-07-31 DIAGNOSIS — E78.5 DYSLIPIDEMIA: Chronic | ICD-10-CM

## 2024-07-31 DIAGNOSIS — D72.820 LYMPHOCYTOSIS: Chronic | ICD-10-CM

## 2024-07-31 DIAGNOSIS — Z00.00 MEDICARE ANNUAL WELLNESS VISIT, SUBSEQUENT: Primary | ICD-10-CM

## 2024-07-31 DIAGNOSIS — R97.20 ELEVATED PSA, BETWEEN 10 AND LESS THAN 20 NG/ML: Chronic | ICD-10-CM

## 2024-07-31 DIAGNOSIS — Z12.5 SCREENING FOR PROSTATE CANCER: ICD-10-CM

## 2024-07-31 PROCEDURE — G0439 PPPS, SUBSEQ VISIT: HCPCS | Mod: ,,, | Performed by: GENERAL PRACTICE

## 2024-07-31 PROCEDURE — 88185 FLOWCYTOMETRY/TC ADD-ON: CPT | Mod: 59

## 2024-07-31 PROCEDURE — 36415 COLL VENOUS BLD VENIPUNCTURE: CPT | Performed by: GENERAL PRACTICE

## 2024-07-31 PROCEDURE — 88184 FLOWCYTOMETRY/ TC 1 MARKER: CPT | Performed by: GENERAL PRACTICE

## 2024-07-31 NOTE — PROGRESS NOTES
Patient ID: [id number]     Chief Complaint: Annual Exam      HPI:     Jd Nelson is a 69 y.o. male here today for a Medicare Wellness. No other complaints today.       -------------------------------------    Dyslipidemia    Irritable bowel syndrome    Primary hypertension        History reviewed. No pertinent surgical history.    Review of patient's allergies indicates:  No Known Allergies    No outpatient medications have been marked as taking for the 7/31/24 encounter (Office Visit) with Galo Hussein MD.       Social History     Socioeconomic History    Marital status:    Tobacco Use    Smoking status: Never    Smokeless tobacco: Never        No family history on file.     Patient Care Team:  Galo Hussein MD as PCP - General (Family Medicine)  Joao St MD as Consulting Physician (Urology)  Enoc Gaytan MD as Consulting Physician (Gastroenterology)     Opioid Screening: Patient medication list reviewed, patient is not taking prescription opioids. Patient is not using additional opioids than prescribed. Patient is at low risk of substance abuse based on this opioid use history.       Subjective:     Review of Systems   Constitutional: Negative.  Negative for malaise/fatigue and weight loss.   HENT: Negative.     Eyes: Negative.    Respiratory: Negative.  Negative for shortness of breath.    Cardiovascular: Negative.  Negative for chest pain.   Gastrointestinal: Negative.    Genitourinary: Negative.    Musculoskeletal: Negative.    Skin: Negative.    Neurological: Negative.  Negative for focal weakness, weakness and headaches.   Endo/Heme/Allergies: Negative.    Psychiatric/Behavioral: Negative.  Negative for depression and memory loss. The patient is not nervous/anxious.          Patient Reported Health Risk Assessment  What [age] 65-69  Are you male or female?: Male  During the past four weeks, how much have you been bothered by emotional problems such as feeling  anxious, depressed, irritable, sad, or downhearted and blue?: Not at all  During the past five weeks, has your physical and/or emotional health limited your social activities with family, friends, neighbors, or groups?: Not at all  During the past four weeks, how much bodily pain have you generally had?: No pain  During the past four weeks, was someone available to help if you needed and wanted help?: Yes, as much as I wanted  During the past four weeks, what was the hardest physical activity you could do for at least two minutes?: Light  Can you get to places out of walking distance without help?  (For example, can you travel alone on buses or taxis, or drive your own car?): Yes  Can you go shopping for groceries or clothes without someone's help?: Yes  Can you prepare your own meals?: Yes  Can you do your own housework without help?: Yes  Because of any health problems, do you need the help of another person with your personal care needs such as eating, bathing, dressing, or getting around the house?: No  Can you handle your own money without help?: Yes  During the past four weeks, how would you rate your health in general?: Good  How have things been going for you during the past four weeks?: Pretty well  Are you having difficulties driving your car?: No  Do you always fasten your seat belt when you are in a car?: Yes, usually  How often in the past four weeks have you been bothered by falling or dizzy when standing up?: Never  How often in the past four weeks have you been bothered by sexual problems?: Never  How often in the past four weeks have you been bothered by trouble eating well?: Never  How often in the past four weeks have you been bothered by teeth or denture problems?: Never  How often in the past four weeks have you been bothered with problems using the telephone?: Never  How often in the past four weeks have you been bothered by tiredness or fatigue?: Never  Have you fallen two or more times in the  "past year?: No  Are you afraid of falling?: No  Are you a smoker?: No  During the past four weeks, how many drinks of wine, beer, or other alcoholic beverages did you have?: One drink or less per week  Do you exercise for about 20 minutes three or more days a week?: No, I usually do not exercise this much  Have you been given any information to help you with hazards in your house that might hurt you?: No  Have you been given any information to help you with keeping track of your medications?: No  How often do you have trouble taking medicines the way you've been told to take them?: I always take them as prescribed  How confident are you that you can control and manage most of your health problems?: Very confident  What is your race? (Check all that apply.):     Objective:     /73   Pulse 100   Resp 18   Ht 5' 5" (1.651 m)   Wt 79.4 kg (175 lb)   SpO2 99%   BMI 29.12 kg/m²     Physical Exam  Constitutional:       Appearance: Normal appearance.   HENT:      Head: Normocephalic.      Mouth/Throat:      Mouth: Mucous membranes are moist.      Pharynx: Oropharynx is clear.   Eyes:      Conjunctiva/sclera: Conjunctivae normal.      Pupils: Pupils are equal, round, and reactive to light.   Cardiovascular:      Rate and Rhythm: Normal rate and regular rhythm.      Heart sounds: Normal heart sounds.   Pulmonary:      Effort: Pulmonary effort is normal.      Breath sounds: Normal breath sounds.   Abdominal:      General: Abdomen is flat.      Palpations: Abdomen is soft.   Musculoskeletal:         General: Normal range of motion.      Cervical back: Neck supple.   Skin:     General: Skin is warm and dry.   Neurological:      General: No focal deficit present.      Mental Status: He is alert and oriented to person, place, and time.   Psychiatric:         Mood and Affect: Mood normal.         Behavior: Behavior normal.         Thought Content: Thought content normal.         Judgment: Judgment normal. "         Lab Results   Component Value Date     07/26/2024    K 4.0 07/26/2024     07/26/2024    CO2 27 07/26/2024    BUN 12.4 07/26/2024    CREATININE 1.05 07/26/2024    CALCIUM 9.9 07/26/2024    ALBUMIN 4.2 07/26/2024    BILITOT 0.8 07/26/2024    ALKPHOS 64 07/26/2024    AST 17 07/26/2024    ALT 16 07/26/2024    EGFRNORACEVR >60 07/26/2024     Lab Results   Component Value Date    WBC 19.31 (H) 07/26/2024    WBC 19.31 07/26/2024    RBC 5.04 07/26/2024    HGB 15.2 07/26/2024    HCT 46.3 07/26/2024    MCV 91.9 07/26/2024    RDW 13.7 07/26/2024     07/26/2024      Lab Results   Component Value Date    CHOL 238 (H) 07/26/2024    TRIG 130 07/26/2024    HDL 51 07/26/2024    .00 (H) 07/26/2024    TOTALCHOLEST 5 07/26/2024     Lab Results   Component Value Date    TSH 1.179 07/26/2024     Lab Results   Component Value Date    HGBA1C 5.4 07/26/2024        Lab Results   Component Value Date    PSA 11.55 (H) 04/18/2019              No data to display                  4/6/2023     1:30 PM   Fall Risk Assessment - Outpatient   Mobility Status Ambulatory   Number of falls 0   Identified as fall risk False           Depression Screening  Over the past two weeks, has the patient felt down, depressed, or hopeless?: No  Over the past two weeks, has the patient felt little interest or pleasure in doing things?: No  Functional Ability/Safety Screening  Was the patient's timed Up & Go test unsteady or longer than 30 seconds?: No  Does the patient need help with phone, transportation, shopping, preparing meals, housework, laundry, meds, or managing money?: No  Does the patient's home have rugs in the hallway, lack grab bars in the bathroom, lack handrails on the stairs or have poor lighting?: No  Have you noticed any hearing difficulties?: No  Cognitive Function (Assessed through direct observation with due consideration of information obtained by way of patient reports and/or concerns raised by family,  friends, caretakers, or others)    Does the patient repeat questions/statements in the same day?: No  Does the patient have trouble remembering the date, year, and time?: No  Does the patient have difficulty managing finances?: No  Does the patient have a decreased sense of direction?: No  Assessment:       ICD-10-CM ICD-9-CM   1. Medicare annual wellness visit, subsequent  Z00.00 V70.0   2. Screening for prostate cancer  Z12.5 V76.44   3. Dyslipidemia  E78.5 272.4   4. Elevated PSA, between 10 and less than 20 ng/ml  R97.20 790.93   5. Lymphocytosis  D72.820 288.61   6. Abnormal blood chemistry  R79.9 790.6        Plan:     Medicare Annual Wellness and Personalized Prevention Plan:   Fall Risk + Home Safety + Hearing Impairment + Depression Screen + Cognitive Impairment Screen + Health Risk Assessment all reviewed.       Health Maintenance Topics with due status: Not Due       Topic Last Completion Date    Colorectal Cancer Screening 10/20/2022    Hemoglobin A1c (Diabetic Prevention Screening) 07/26/2024    Lipid Panel 07/26/2024    Influenza Vaccine Not Due      The patient's Health Maintenance was reviewed and the following appears to be due at this time:   There are no preventive care reminders to display for this patient.  Health risk assessment:  After review of the patient's medical record as it relates to health risk assessment over the next 5-10 years per recommendations of the [place]PSTF, it was determined that all pertinent recommendations have been appropriately addressed. The patient does not desire any further preventative care measures or screening tests at this time.  We will continue to reassess at each annual visit.    1. Medicare annual wellness visit, subsequent  Comments:  Overall health status was reviewed.  Good health habits reinforced.  Annual wellness exams recommended.    2. Screening for prostate cancer  Comments:  Sees Dr. St for prostate cancer screening.    3.  Dyslipidemia  Overview:  Dyslipidemia is being treated using lifestyle modification only.  Patient is not being prescribed lipid-lowering medication.  As discussed, we will continue to monitor this, and may ultimately choose to prescribe medication if this becomes difficult to control utilizing lifestyle modification only.    Orders:  -     Lipid Panel; Future; Expected date: 07/31/2025  -     TSH; Future; Expected date: 07/31/2025    4. Elevated PSA, between 10 and less than 20 ng/ml  Overview:  Sees Urology, Dr. St.      5. Lymphocytosis  Overview:  Unknown etiology, could be clinically significant.  Flow cytometry will be ordered.      4 d ago 5 yr ago     WBC 4.50 - 11.50 x10(3)/mcL 19.31 High  7.6 R   RBC 4.70 - 6.10 x10(6)/mcL 5.04 5.11   Hgb 14.0 - 18.0 g/dL 15.2 15.9 R   Hct 42.0 - 52.0 % 46.3 45.2   MCV 80.0 - 94.0 fL 91.9 88 R   MCH 27.0 - 31.0 pg 30.2 31.1 R   MCHC 33.0 - 36.0 g/dL 32.8 Low  35 R   RDW 11.5 - 17.0 % 13.7 12.9   Platelet 130 - 400 x10(3)/mcL 228 219   MPV 7.4 - 10.4 fL 10.5 High  10.0 R   NRBC% % 0.0              Component Ref Range & Units 4 d ago  (7/26/24) 4 d ago  (7/26/24) 5 yr ago  (4/18/19) 5 yr ago  (4/18/19)   WBC x10(3)/mcL 19.31 19.31 High  R 7.6 R    Neutrophils % % 44      Lymphs % % 49      Monocytes % % 4   7.7 R   Eosinophils % % 4      Neutrophils Abs 2.1 - 9.2 x10(3)/mcL 8.4964  4.7    Lymphs Abs 0.6 - 4.6 x10(3)/mcL 9.4619 High    2.3 R   Monocytes Abs 0.1 - 1.3 x10(3)/mcL 0.7724   0.6   Eosinophils Abs 0 - 0.9 x10(3)/mcL 0.7724      Platelets Normal, Adequate Normal      RBC Morph Normal Normal               Numerous small to medium size lymphocytes with compact chromatin pattern, mildly irregular nuclei, small amount of azurophilic cytoplasm and occasional nucleoli are present. Innumerable smudge cells are noted. These findings are suspicious for a chronic lymphoproliferative disorder. A flow cytometry analysis of the lymphoid population is recommended if not  already performed. The red blood cells, granulocytes, monocytes and platelets are unremarkable.       Orders:  -     Cancel: Flow Cytometry Panel; Future; Expected date: 07/31/2024  -     Comprehensive Metabolic Panel; Future; Expected date: 07/31/2025  -     CBC Auto Differential; Future; Expected date: 07/31/2025  -     Flow Cytometry Panel; Future; Expected date: 07/31/2024    6. Abnormal blood chemistry  -     Hemoglobin A1C; Future; Expected date: 07/31/2025            Advance Care Planning     Date: 07/31/2024    Living Will  During this visit, I engaged the patient  in the voluntary advance care planning process.  The patient and I reviewed the role for advance directives and their purpose in directing future healthcare if the patient's unable to speak for him/herself.  At this point in time, the patient does have full decision-making capacity.  We discussed different extreme health states that he could experience, and reviewed what kind of medical care he would want in those situations.  The patient communicated that if he were comatose and had little chance of a meaningful recovery, he would not want machines/life-sustaining treatments used. In addition to the above preference, other important end-of-life issues for the patient include no other issues.  Patient does have a living will/advanced care planning, will bring a copy to the clinic so that we can place it in the chart.  Patient may also be given the option to complete our advanced care planning paperwork so that we may enter it into the medical record today.  I spent a total of 5 minutes engaging the patient in this advance care planning discussion.              No current outpatient medications     Follow up in about 1 year (around 8/5/2025) for Medicare Wellness. In addition to their scheduled follow up, the patient has also been instructed to follow up on as needed basis.

## 2024-08-06 ENCOUNTER — TELEPHONE (OUTPATIENT)
Dept: PRIMARY CARE CLINIC | Facility: CLINIC | Age: 69
End: 2024-08-06
Payer: COMMERCIAL

## 2024-08-06 DIAGNOSIS — C91.10 CHRONIC LYMPHOCYTIC LEUKEMIA: Primary | Chronic | ICD-10-CM

## 2024-08-09 NOTE — PROGRESS NOTES
Referring physician: Dr. Galo Hussein  Reason for referral: CLL      Subjective:       Patient ID: Jd Nelson is a 69 y.o. male.    Chronic Lymphocytic Leukemia--Diagnosed 7/31/24    Work-up:  1. Peripheral smear review done 7/26/24--Numerous small to medium size lymphocytes with compact chromatin pattern, mildly irregular nuclei, small amount of azurophilic cytoplasm and occasional nucleoli are present. Innumerable smudge cells are noted. These findings are suspicious for a chronic lymphoproliferative disorder. A flow cytometry analysis of the lymphoid population is recommended if not already performed. The red blood cells, granulocytes, monocytes and platelets are unremarkable.   2. Flow cytometry on peripheral blood done 7/31/24--Chronic lymphocytic leukemia, biclonal (predominately kappa light chain restricted).       Current treatment plan: Observation      Chief Complaint: Other Misc (NPO)    HPI  68 yo male found on routine labs to have elevated WBC with lymphocytosis. Peripheral smear with smudge cells and flow cytometry confirmed CLL. Patient has been referred to me for evaluation. He reports having a chronically elevated PSA, has had MRI and 2 different sets of biopsies that were negative. He is seeing Dr. St and is set up for a repeat prostate MRI soon. Patient denies any B-symptoms. The remainder of his CBC is normal. Explained CLL and given his findings and no symptoms, will likely be for observation only. He says he felt kurt enlarged nodes in left inguinal region a few weeks ago and went away, otherwise denies any adenopathy.     Past Medical History:   Diagnosis Date    Dyslipidemia 4/5/2023    Irritable bowel syndrome 4/5/2023    Primary hypertension 4/5/2023      History reviewed. No pertinent surgical history.  Family History   Problem Relation Name Age of Onset    Asthma Sister      Diabetes Brother       Social History     Socioeconomic History    Marital status:    Tobacco  "Use    Smoking status: Never    Smokeless tobacco: Never   Substance and Sexual Activity    Alcohol use: Yes    Drug use: Never       Review of patient's allergies indicates:  No Known Allergies   No current outpatient medications on file prior to visit.     No current facility-administered medications on file prior to visit.      Review of Systems   Constitutional:  Negative for appetite change, fatigue, fever and unexpected weight change.   HENT:  Negative for mouth sores.    Eyes: Negative.    Respiratory:  Negative for cough and shortness of breath.    Cardiovascular:  Negative for chest pain and leg swelling.   Gastrointestinal:  Negative for abdominal distention, abdominal pain, constipation, diarrhea, nausea, vomiting and reflux.   Genitourinary:  Negative for difficulty urinating, dysuria and hematuria.   Musculoskeletal:  Negative for arthralgias and back pain.   Integumentary:  Negative for rash.   Neurological:  Negative for weakness and headaches.   Hematological:  Negative for adenopathy.   Psychiatric/Behavioral:  Negative for sleep disturbance. The patient is not nervous/anxious.             Vitals:    08/13/24 1342   BP: 114/84   Pulse: 79   Resp: 14   Temp: 97.9 °F (36.6 °C)   TempSrc: Oral   SpO2: 95%   Weight: 80.2 kg (176 lb 14.4 oz)   Height: 5' 5" (1.651 m)      Physical Exam  Constitutional:       General: He is awake.      Appearance: Normal appearance.   HENT:      Head: Normocephalic and atraumatic.      Nose: Nose normal.      Mouth/Throat:      Mouth: Mucous membranes are moist.   Eyes:      General: Vision grossly intact.      Extraocular Movements: Extraocular movements intact.      Conjunctiva/sclera: Conjunctivae normal.   Cardiovascular:      Rate and Rhythm: Normal rate and regular rhythm.      Heart sounds: Normal heart sounds.   Pulmonary:      Effort: Pulmonary effort is normal.      Breath sounds: Normal breath sounds.   Abdominal:      General: Bowel sounds are normal. There is " no distension.      Palpations: Abdomen is soft.      Tenderness: There is no abdominal tenderness.   Musculoskeletal:      Cervical back: Normal range of motion and neck supple.      Right lower leg: No edema.      Left lower leg: No edema.   Lymphadenopathy:      Cervical: No cervical adenopathy.      Upper Body:      Right upper body: No supraclavicular adenopathy.      Left upper body: No supraclavicular adenopathy.   Skin:     General: Skin is warm.   Neurological:      Mental Status: He is alert and oriented to person, place, and time.      Motor: Motor function is intact.   Psychiatric:         Mood and Affect: Mood normal.         Speech: Speech normal.         Behavior: Behavior is cooperative.         Judgment: Judgment normal.         Clinical Support on 07/26/2024   Component Date Value Ref Range Status    TSH 07/26/2024 1.179  0.350 - 4.940 uIU/mL Final    Hemoglobin A1c 07/26/2024 5.4  <=7.0 % Final    Estimated Average Glucose 07/26/2024 108.3  mg/dL Final    Cholesterol Total 07/26/2024 238 (H)  <=200 mg/dL Final    HDL Cholesterol 07/26/2024 51  35 - 60 mg/dL Final    Triglyceride 07/26/2024 130  34 - 140 mg/dL Final    Cholesterol/HDL Ratio 07/26/2024 5  0 - 5 Final    Very Low Density Lipoprotein 07/26/2024 26   Final    LDL Cholesterol 07/26/2024 161.00 (H)  50.00 - 140.00 mg/dL Final    Sodium 07/26/2024 140  136 - 145 mmol/L Final    Potassium 07/26/2024 4.0  3.5 - 5.1 mmol/L Final    Chloride 07/26/2024 106  98 - 107 mmol/L Final    CO2 07/26/2024 27  23 - 31 mmol/L Final    Glucose 07/26/2024 103  82 - 115 mg/dL Final    Blood Urea Nitrogen 07/26/2024 12.4  8.4 - 25.7 mg/dL Final    Creatinine 07/26/2024 1.05  0.73 - 1.18 mg/dL Final    Calcium 07/26/2024 9.9  8.8 - 10.0 mg/dL Final    Protein Total 07/26/2024 7.2  5.8 - 7.6 gm/dL Final    Albumin 07/26/2024 4.2  3.4 - 4.8 g/dL Final    Globulin 07/26/2024 3.0  2.4 - 3.5 gm/dL Final    Albumin/Globulin Ratio 07/26/2024 1.4  1.1 - 2.0 ratio  Final    Bilirubin Total 07/26/2024 0.8  <=1.5 mg/dL Final    ALP 07/26/2024 64  40 - 150 unit/L Final    ALT 07/26/2024 16  0 - 55 unit/L Final    AST 07/26/2024 17  5 - 34 unit/L Final    eGFR 07/26/2024 >60  mL/min/1.73/m2 Final    Anion Gap 07/26/2024 7.0  mEq/L Final    BUN/Creatinine Ratio 07/26/2024 12   Final    WBC 07/26/2024 19.31 (H)  4.50 - 11.50 x10(3)/mcL Final    RBC 07/26/2024 5.04  4.70 - 6.10 x10(6)/mcL Final    Hgb 07/26/2024 15.2  14.0 - 18.0 g/dL Final    Hct 07/26/2024 46.3  42.0 - 52.0 % Final    MCV 07/26/2024 91.9  80.0 - 94.0 fL Final    MCH 07/26/2024 30.2  27.0 - 31.0 pg Final    MCHC 07/26/2024 32.8 (L)  33.0 - 36.0 g/dL Final    RDW 07/26/2024 13.7  11.5 - 17.0 % Final    Platelet 07/26/2024 228  130 - 400 x10(3)/mcL Final    MPV 07/26/2024 10.5 (H)  7.4 - 10.4 fL Final    NRBC% 07/26/2024 0.0  % Final    WBC 07/26/2024 19.31  x10(3)/mcL Final    Neutrophils % 07/26/2024 44  % Final    Lymphs % 07/26/2024 49  % Final    Monocytes % 07/26/2024 4  % Final    Eosinophils % 07/26/2024 4  % Final    Neutrophils Abs 07/26/2024 8.4964  2.1 - 9.2 x10(3)/mcL Final    Lymphs Abs 07/26/2024 9.4619 (H)  0.6 - 4.6 x10(3)/mcL Final    Monocytes Abs 07/26/2024 0.7724  0.1 - 1.3 x10(3)/mcL Final    Eosinophils Abs 07/26/2024 0.7724  0 - 0.9 x10(3)/mcL Final    Platelets 07/26/2024 Normal  Normal, Adequate Final    RBC Morph 07/26/2024 Normal  Normal Final    Peripheral Smear Evaluation 07/26/2024    Final                    Value:Numerous small to medium size lymphocytes with compact chromatin pattern, mildly irregular nuclei, small amount of azurophilic cytoplasm and occasional nucleoli are present. Innumerable smudge cells are noted. These findings are suspicious for a chronic lymphoproliferative disorder. A flow cytometry analysis of the lymphoid population is recommended if not already performed. The red blood cells, granulocytes, monocytes and platelets are unremarkable.    Austyn Lange M.D.,  Ph.D.     [place] Generic Orderable 07/31/2024 SEE COMMENTS   Final    Comment:    Test                                Result        Flag  Unit  RefValue  ----------------------------------------------------------------------  Leukemia/Lymphoma, Phenotype    LCMS Result                       Performed                             Final Diagnosis:                  SEE COMMENTS                        Peripheral blood, flow cytometric immunophenotyping:     Chronic lymphocytic leukemia, biclonal (predominately kappa   light chain restricted).     Reviewed by: Dariusz العلي D.O., M.S.    Special Studies:                  SEE COMMENTS                        WBC:  15.4 x 10(9)/L  %Lymphs (CBC/automated differential):  79%  #Lymphs (CBC/automated differential):  12.2 x 10(9)/L     Results:  Blasts:  Not increased by CD45/side scatter and CD34.     B-cells:  Two monotypic B-cell populations are present.  Population 1: Monotypic lambda (dim).  Express:  CD19 (dim), CD20 (dim), CD22 (dim), CD5, CD23,   . Do not express:  CD10, CD38, CD11c, . Estimated   size:  78%                            gated lymphoid events; 63% total analyzed   events. Absolute clonal B-cell count (calculated):  9.5 x   10(9)/L.     Population 2:  Monotypic (cytoplasmic) kappa.  Express:  CD19 (dim), CD20 (dim), CD22 (dim), CD5, CD23,   . Do not express:  CD10, CD38, CD11c, . Estimated   size:  3% gated lymphoid events; 3% total analyzed events.   Absolute clonal B-cell count (calculated):  0.4 x 10(9)/L.     B-cell markers tested: Triage panel: CD10, CD19, CD45 and   kappa and lambda immunoglobulin light chains. B-cell panel:   CD5, CD11c, CD19, CD20, CD22, CD23, CD38, CD45, ,    and kappa and lambda immunoglobulin light chains.   Cytoplasmic kappa and lambda immunoglobulin light chains.     T-cells/NK-cells:  No aberrant phenotype by CD3 and CD16.     Quality Assessment:  Specimen received within validated   guidelines.     Microscopic Description           SEE COMMENTS                        A [place]-Zoeyou-boswoyl slide prepared from the flow   cytometry specimen is examined.                             The specimen contains   small lymphocytes with round nuclear contours, coarsely   clumped chromatin and inconspicuous nucleoli.     -------------------ADDITIONAL INFORMATION-------------------  This test was developed using an analyte specific reagent.   Its performance characteristics were determined by [facility] in a manner consistent with CLIA requirements. This   test has not been cleared or approved by the U.S. Food and   Drug Administration.    Reason for Referral               lumphocytosis                         Specimen Source                   Peripheral blood     Test Performed by:  [facility] [place] [address]  : Sarabjit Monsalve Ph.D.; CLIA# [id number]          Assessment:       1. Chronic lymphocytic leukemia    2. Genetic anomalies of leukocytes         Plan:       Patient with CLL, appears to be Joshua stage 0 at this time.  Remainder of CBC is normal.  He has no B-symptoms.    Will obtain baseline CT soft tissue neck/C/A/P.  Check FISH CLL panel and IgHV mutation status along with LDH.    Plan for likely observation only.    F/u visit for results in 2 weeks.  Plan to monitor blood counts every 3 months.    All questions answered at this time.    Karly Hernandez MD

## 2024-08-12 ENCOUNTER — TELEPHONE (OUTPATIENT)
Dept: HEMATOLOGY/ONCOLOGY | Facility: CLINIC | Age: 69
End: 2024-08-12
Payer: MEDICARE

## 2024-08-12 NOTE — TELEPHONE ENCOUNTER
Spoke with patient regarding new patient appointment 8/12/24 with Dr. Hernandez. Patient denied concerns or questions at this time. Confirmed appointment date, time and location with patient.

## 2024-08-13 ENCOUNTER — OFFICE VISIT (OUTPATIENT)
Dept: HEMATOLOGY/ONCOLOGY | Facility: CLINIC | Age: 69
End: 2024-08-13
Payer: COMMERCIAL

## 2024-08-13 VITALS
HEART RATE: 79 BPM | SYSTOLIC BLOOD PRESSURE: 114 MMHG | TEMPERATURE: 98 F | WEIGHT: 176.88 LBS | DIASTOLIC BLOOD PRESSURE: 84 MMHG | BODY MASS INDEX: 29.47 KG/M2 | OXYGEN SATURATION: 95 % | HEIGHT: 65 IN | RESPIRATION RATE: 14 BRPM

## 2024-08-13 DIAGNOSIS — D72.0 GENETIC ANOMALIES OF LEUKOCYTES: ICD-10-CM

## 2024-08-13 DIAGNOSIS — C91.10 CHRONIC LYMPHOCYTIC LEUKEMIA: Chronic | ICD-10-CM

## 2024-08-13 LAB
ABS NEUT CALC (OHS): 5.7 X10(3)/MCL (ref 2.1–9.2)
ERYTHROCYTE [DISTWIDTH] IN BLOOD BY AUTOMATED COUNT: 13.9 % (ref 11.5–17)
HCT VFR BLD AUTO: 44.6 % (ref 42–52)
HGB BLD-MCNC: 14.8 G/DL (ref 14–18)
LDH SERPL-CCNC: 92 U/L (ref 125–220)
LYMPHOCYTES NFR BLD MANUAL: 13.36 X10(3)/MCL
LYMPHOCYTES NFR BLD MANUAL: 68 % (ref 13–40)
MCH RBC QN AUTO: 30.6 PG (ref 27–31)
MCHC RBC AUTO-ENTMCNC: 33.2 G/DL (ref 33–36)
MCV RBC AUTO: 92.3 FL (ref 80–94)
MONOCYTES NFR BLD MANUAL: 0.59 X10(3)/MCL (ref 0.1–1.3)
MONOCYTES NFR BLD MANUAL: 3 % (ref 2–11)
NEUTROPHILS NFR BLD MANUAL: 29 % (ref 47–80)
PLATELET # BLD AUTO: 215 X10(3)/MCL (ref 130–400)
PLATELET # BLD EST: NORMAL 10*3/UL
PMV BLD AUTO: 9.6 FL (ref 7.4–10.4)
RBC # BLD AUTO: 4.83 X10(6)/MCL (ref 4.7–6.1)
RBC MORPH BLD: NORMAL
WBC # BLD AUTO: 19.64 X10(3)/MCL (ref 4.5–11.5)

## 2024-08-13 PROCEDURE — 3288F FALL RISK ASSESSMENT DOCD: CPT | Mod: CPTII,S$GLB,, | Performed by: INTERNAL MEDICINE

## 2024-08-13 PROCEDURE — 99205 OFFICE O/P NEW HI 60 MIN: CPT | Mod: S$GLB,,, | Performed by: INTERNAL MEDICINE

## 2024-08-13 PROCEDURE — 36415 COLL VENOUS BLD VENIPUNCTURE: CPT | Performed by: INTERNAL MEDICINE

## 2024-08-13 PROCEDURE — 1159F MED LIST DOCD IN RCRD: CPT | Mod: CPTII,S$GLB,, | Performed by: INTERNAL MEDICINE

## 2024-08-13 PROCEDURE — 85027 COMPLETE CBC AUTOMATED: CPT | Performed by: INTERNAL MEDICINE

## 2024-08-13 PROCEDURE — 1160F RVW MEDS BY RX/DR IN RCRD: CPT | Mod: CPTII,S$GLB,, | Performed by: INTERNAL MEDICINE

## 2024-08-13 PROCEDURE — 99999 PR PBB SHADOW E&M-EST. PATIENT-LVL IV: CPT | Mod: PBBFAC,,, | Performed by: INTERNAL MEDICINE

## 2024-08-13 PROCEDURE — 1101F PT FALLS ASSESS-DOCD LE1/YR: CPT | Mod: CPTII,S$GLB,, | Performed by: INTERNAL MEDICINE

## 2024-08-13 PROCEDURE — 3079F DIAST BP 80-89 MM HG: CPT | Mod: CPTII,S$GLB,, | Performed by: INTERNAL MEDICINE

## 2024-08-13 PROCEDURE — 3044F HG A1C LEVEL LT 7.0%: CPT | Mod: CPTII,S$GLB,, | Performed by: INTERNAL MEDICINE

## 2024-08-13 PROCEDURE — 83615 LACTATE (LD) (LDH) ENZYME: CPT | Performed by: INTERNAL MEDICINE

## 2024-08-13 PROCEDURE — 85007 BL SMEAR W/DIFF WBC COUNT: CPT | Performed by: INTERNAL MEDICINE

## 2024-08-13 PROCEDURE — 1126F AMNT PAIN NOTED NONE PRSNT: CPT | Mod: CPTII,S$GLB,, | Performed by: INTERNAL MEDICINE

## 2024-08-13 PROCEDURE — 3074F SYST BP LT 130 MM HG: CPT | Mod: CPTII,S$GLB,, | Performed by: INTERNAL MEDICINE

## 2024-08-13 PROCEDURE — 3008F BODY MASS INDEX DOCD: CPT | Mod: CPTII,S$GLB,, | Performed by: INTERNAL MEDICINE

## 2024-08-25 NOTE — PROGRESS NOTES
Subjective:       Patient ID: Jd Nelson is a 69 y.o. male.    PCP: Dr. Galo Hussein    Chronic Lymphocytic Leukemia Joshua Stage I--Diagnosed 7/31/24    Work-up:  1. Peripheral smear review done 7/26/24--Numerous small to medium size lymphocytes with compact chromatin pattern, mildly irregular nuclei, small amount of azurophilic cytoplasm and occasional nucleoli are present. Innumerable smudge cells are noted. These findings are suspicious for a chronic lymphoproliferative disorder. A flow cytometry analysis of the lymphoid population is recommended if not already performed. The red blood cells, granulocytes, monocytes and platelets are unremarkable.   2. Flow cytometry on peripheral blood done 7/31/24--Chronic lymphocytic leukemia, biclonal (predominately kappa light chain restricted).   3. 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   Imaging:  CT soft tissue neck/C/A/P 8/20/24--no lymphadenopathy in neck, advanced atherosclerotic disease at the bilateral carotid bulbs and proximal internal carotid arteries with high grade stenosis at right proximal ICA and approximately 50% stenosis at left proximal ICA, recommend doppler US, diffuse cervical spine degenerative disease, single enlarged pre-vascular mediastinal LN vs. 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       Current treatment plan: Observation      Chief Complaint: Diarrhea and Fatigue    HPI  Patient here for follow-up of CLL and results of CT. I went over results which shows other incidental findings including PAD and a possibly thymoma. Discussed  "referral to CV surgery. There was only mild left inguinal adenopathy, but otherwise no other lymphadenopathy.       Past Medical History:   Diagnosis Date    Dyslipidemia 4/5/2023    Irritable bowel syndrome 4/5/2023    Primary hypertension 4/5/2023      History reviewed. No pertinent surgical history.  Family History   Problem Relation Name Age of Onset    Asthma Sister      Diabetes Brother       Social History     Socioeconomic History    Marital status:    Tobacco Use    Smoking status: Never    Smokeless tobacco: Never   Substance and Sexual Activity    Alcohol use: Yes    Drug use: Never       Review of patient's allergies indicates:  No Known Allergies   No current outpatient medications on file prior to visit.     No current facility-administered medications on file prior to visit.      Review of Systems   Constitutional:  Negative for appetite change, fatigue, fever and unexpected weight change.   HENT:  Negative for mouth sores.    Eyes: Negative.    Respiratory:  Negative for cough and shortness of breath.    Cardiovascular:  Negative for chest pain and leg swelling.   Gastrointestinal:  Negative for abdominal distention, abdominal pain, constipation, diarrhea, nausea, vomiting and reflux.   Genitourinary:  Negative for difficulty urinating, dysuria and hematuria.   Musculoskeletal:  Negative for arthralgias and back pain.   Integumentary:  Negative for rash.   Neurological:  Negative for weakness and headaches.   Hematological:  Negative for adenopathy.   Psychiatric/Behavioral:  Negative for sleep disturbance. The patient is not nervous/anxious.             Vitals:    08/27/24 1313   BP: 115/84   Pulse: 86   Resp: 14   Temp: 98.2 °F (36.8 °C)   TempSrc: Oral   SpO2: 96%   Weight: 80.5 kg (177 lb 6.4 oz)   Height: 5' 5" (1.651 m)        Physical Exam  Constitutional:       General: He is awake.      Appearance: Normal appearance.   HENT:      Head: Normocephalic and atraumatic.      Nose: Nose " normal.      Mouth/Throat:      Mouth: Mucous membranes are moist.   Eyes:      General: Vision grossly intact.      Extraocular Movements: Extraocular movements intact.      Conjunctiva/sclera: Conjunctivae normal.   Cardiovascular:      Rate and Rhythm: Normal rate and regular rhythm.      Heart sounds: Normal heart sounds.   Pulmonary:      Effort: Pulmonary effort is normal.      Breath sounds: Normal breath sounds.   Abdominal:      General: Bowel sounds are normal. There is no distension.      Palpations: Abdomen is soft.      Tenderness: There is no abdominal tenderness.   Musculoskeletal:      Cervical back: Normal range of motion and neck supple.      Right lower leg: No edema.      Left lower leg: No edema.   Lymphadenopathy:      Cervical: No cervical adenopathy.      Upper Body:      Right upper body: No supraclavicular adenopathy.      Left upper body: No supraclavicular adenopathy.   Skin:     General: Skin is warm.   Neurological:      Mental Status: He is alert and oriented to person, place, and time.      Motor: Motor function is intact.   Psychiatric:         Mood and Affect: Mood normal.         Speech: Speech normal.         Behavior: Behavior is cooperative.         Judgment: Judgment normal.         Office Visit on 08/13/2024   Component Date Value Ref Range Status    BCLL Result 08/13/2024 see interpretation   Final    Specimen Type 08/13/2024 Blood   Final    Final Diagnosis 08/13/2024 SEE COMMENTS   Final    Comment: Peripheral blood, IGH somatic hypermutation analysis:     A mutated IGHV3-30-3*01 rearrangement was identified   (mutation level 8.2%).     Somatic hypermutation of the immunoglobulin heavy chain   gene variable region (IGH-V) status is a recognized   prognostic marker in chronic lymphocytic leukemia. Mutated   CLL is defined by the presence of >2% IGH-V somatic   mutation (or <98% identity to the closest germline   sequence) and is independently associated with a relatively    favorable prognosis. In contrast, unmutated IGH-V status,   defined as <=2% somatic mutation (or >=98% germline   sequence identity) is associated with relatively adverse   prognosis (Martita MARTINEZ et al, 2002, [id number]). Correlation of   these results with clinical, pathologic and other pertinent   laboratory data is required for final interpretation.     Signing Pathologist: SAM Sutton D.O.S.     -------------------ADDITIONAL INFORMATION-------------------  Method Summary - IGH V-region (IGHV) somatic mutation                              analysis: DNA is extracted and IGH gene rearrangements are   amplified by PCR method using leader and/or FR1 forward   primers. Next generation sequencing of the PCR product   clonal IGH variable (IGHV) region is performed. Sequences   of functional IGHV rearrangements are compared to a   germline IGH sequence database to determine the closest   IGHV gene exon and percent nucleobase identity. Mutated   IGHV status is assigned when the analyzed clonal sequence   is greater than 2% different from the germline reference   and unmutated status is defined as 2% or less deviation   from the reference.  This test was developed and its performance characteristics   determined by [facility] in a manner consistent with CLIA   requirements. This test has not been cleared or approved by   the U.S. Food and Drug Administration.     Test Performed by:  [facility] [place] [address]  : Sarabjit Monsalve Ph.D.; CLIA#                            [id number]    Lactate Dehydrogenase 08/13/2024 92 (L)  125 - 220 U/L Final    Result Summary 08/13/2024 Abnormal   Final    Interpretation 08/13/2024 SEE COMMENTS   Final    The result is abnormal and indicates a heterozygous 13q  deletion in 58% of nuclei.     In CLL, a 13q deletion is associated with a favorable  prognosis (Soares et al., Blood 127:9721-0656, 2016; Hakeem Nelson et  al., Brit J Haematol 173:105-113, 2016).     Clinical and pathologic correlation is recommended.     For monitoring response to therapy, FISH for chromosome 13  is available, test CLLMF [CLL, Specified FISH].    Result Table 08/13/2024 SEE COMMENTS   Final     ----------------------------------------------------------    Abnormality Name                   Result    Abn%  Cutoff%    -13q14.3(Q74H468c7,TRAT2t0)        Abnormal  58    <12.0      -6q23(D6Z1x2,MYBx1)                Normal          <12.0      -11q22.3(W79Y9n3,ATMx1)            Normal          <12.0      +12(D12Z3,MDM2)x3                  Normal          <7.0       -13q14.3x2(P00T366d4,RXTR0v5)      Normal          <5.0       t(11;14) CCND1-XT/IGH-XT fusion    Normal          <4.0       -17p13.1(TP53x1,C17F2v5)           Normal          <15.0      -17(TP53,D17Z1)x1                  Normal          <10.0      ----------------------------------------------------------     Reason for Referral 08/13/2024 SEE COMMENTS   Final    RESULT: chronic lymphocytic leukemia/CLL    Specimen 08/13/2024 Blood   Final    Method 08/13/2024 SEE COMMENTS   Final    Locus and probes                  [Strategy;#Nuclei;Vendor]  -----------------------------------------------------------  6CEN(D6Z1),6q23.3(MYB)                   [COPY#;100;LDT/AM]  11CEN(D11Z1),11q22.3(SHERI)                    [COPY#;100;AM]  11q13(CCND1-XT),14q32(IGH-XT)                [DFISH;200;AM]  12CEN(D12Z3),12q15(MDM2)                     [COPY#;100;AM]  13q14.3(N62L012),13q34(LAMP1)                [COPY#;100;AM]  17p13.1(TP53),17CEN(D17Z1)                   [COPY#;100;AM]     Probe strategies include:  DFISH=dual color, double fusion;  COPY#=region gain and loss.  Scoring Method: Manual     Probe vendors include:  T = [facility]  AM = [employer] ([place], [place])    Additional Information 08/13/2024 SEE COMMENTS   Final    A portion of the testing process was performed at  [facility] site [id number].    Disclaimer 08/13/2024 SEE COMMENTS   Final    Applicable to Analyte Specific Reagent (ASR) and Laboratory  Developed Tests (LDT). This test was developed and its  performance characteristics determined by [facility] in a  manner consistent with CLIA requirements. It has not been  cleared or approved by the U.S. Food and Drug  Administration. This FISH test does not rule out other  chromosome abnormalities.    Released By 08/13/2024 SEE COMMENTS   Final    RESULT: Fernanda Deutsch D.O.     Test Performed by:  [place] [address]  : Sarabjit Monsalve Ph.D.; CLIA# [id number]    Result 08/13/2024 SEE COMMENTS   Final    RESULT: nuc kam([id number],PNJR9f2)[58/100]    WBC 08/13/2024 19.64 (H)  4.50 - 11.50 x10(3)/mcL Final    RBC 08/13/2024 4.83  4.70 - 6.10 x10(6)/mcL Final    Hgb 08/13/2024 14.8  14.0 - 18.0 g/dL Final    Hct 08/13/2024 44.6  42.0 - 52.0 % Final    MCV 08/13/2024 92.3  80.0 - 94.0 fL Final    MCH 08/13/2024 30.6  27.0 - 31.0 pg Final    MCHC 08/13/2024 33.2  33.0 - 36.0 g/dL Final    RDW 08/13/2024 13.9  11.5 - 17.0 % Final    Platelet 08/13/2024 215  130 - 400 x10(3)/mcL Final    MPV 08/13/2024 9.6  7.4 - 10.4 fL Final    Neutrophils % 08/13/2024 29 (L)  47 - 80 % Final    Lymphs % 08/13/2024 68 (H)  13 - 40 % Final    Monocytes % 08/13/2024 3  2 - 11 % Final    Neutrophils Abs Calc 08/13/2024 5.6956  2.1 - 9.2 x10(3)/mcL Final    Lymphs Abs 08/13/2024 13.3552 (H)  0.6 - 4.6 x10(3)/mcL Final    Monocytes Abs 08/13/2024 0.5892  0.1 - 1.3 x10(3)/mcL Final    Platelets 08/13/2024 Normal  Normal, Adequate Final    RBC Morph 08/13/2024 Normal  Normal Final          Assessment:       1. Chronic lymphocytic leukemia    2. Bilateral carotid artery stenosis      Plan:       Patient with CLL, Joshua Stage I.  CT soft tissue neck/C/A/P done 8/20/24 shows only lymphadenopathy in left  inguinal region.  Remainder of CBC is normal.  He has no B-symptoms.  FISH with 13q deletion and IgHV hypermutation c/w favorable prognosis.    No indications for any treatment at this time.  Recommend observation.    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 Will refer to CV surgery for evaluation.  Also had diverticulosis, but he is up to date on colonoscopy.     F/u in 3 months with labs and visit for observation of CLL.     All questions answered at this time.    Karly Hernandez MD

## 2024-08-27 ENCOUNTER — OFFICE VISIT (OUTPATIENT)
Dept: HEMATOLOGY/ONCOLOGY | Facility: CLINIC | Age: 69
End: 2024-08-27
Payer: COMMERCIAL

## 2024-08-27 VITALS
HEIGHT: 65 IN | WEIGHT: 177.38 LBS | OXYGEN SATURATION: 96 % | BODY MASS INDEX: 29.55 KG/M2 | SYSTOLIC BLOOD PRESSURE: 115 MMHG | HEART RATE: 86 BPM | TEMPERATURE: 98 F | DIASTOLIC BLOOD PRESSURE: 84 MMHG | RESPIRATION RATE: 14 BRPM

## 2024-08-27 DIAGNOSIS — C91.10 CHRONIC LYMPHOCYTIC LEUKEMIA: Primary | Chronic | ICD-10-CM

## 2024-08-27 DIAGNOSIS — I65.23 BILATERAL CAROTID ARTERY STENOSIS: ICD-10-CM

## 2024-08-27 PROCEDURE — 1126F AMNT PAIN NOTED NONE PRSNT: CPT | Mod: CPTII,S$GLB,, | Performed by: INTERNAL MEDICINE

## 2024-08-27 PROCEDURE — 3074F SYST BP LT 130 MM HG: CPT | Mod: CPTII,S$GLB,, | Performed by: INTERNAL MEDICINE

## 2024-08-27 PROCEDURE — 1159F MED LIST DOCD IN RCRD: CPT | Mod: CPTII,S$GLB,, | Performed by: INTERNAL MEDICINE

## 2024-08-27 PROCEDURE — 3079F DIAST BP 80-89 MM HG: CPT | Mod: CPTII,S$GLB,, | Performed by: INTERNAL MEDICINE

## 2024-08-27 PROCEDURE — 99214 OFFICE O/P EST MOD 30 MIN: CPT | Mod: S$GLB,,, | Performed by: INTERNAL MEDICINE

## 2024-08-27 PROCEDURE — 1160F RVW MEDS BY RX/DR IN RCRD: CPT | Mod: CPTII,S$GLB,, | Performed by: INTERNAL MEDICINE

## 2024-08-27 PROCEDURE — 3008F BODY MASS INDEX DOCD: CPT | Mod: CPTII,S$GLB,, | Performed by: INTERNAL MEDICINE

## 2024-08-27 PROCEDURE — 1101F PT FALLS ASSESS-DOCD LE1/YR: CPT | Mod: CPTII,S$GLB,, | Performed by: INTERNAL MEDICINE

## 2024-08-27 PROCEDURE — 99999 PR PBB SHADOW E&M-EST. PATIENT-LVL IV: CPT | Mod: PBBFAC,,, | Performed by: INTERNAL MEDICINE

## 2024-08-27 PROCEDURE — 3044F HG A1C LEVEL LT 7.0%: CPT | Mod: CPTII,S$GLB,, | Performed by: INTERNAL MEDICINE

## 2024-08-27 PROCEDURE — 3288F FALL RISK ASSESSMENT DOCD: CPT | Mod: CPTII,S$GLB,, | Performed by: INTERNAL MEDICINE

## 2024-08-28 ENCOUNTER — TELEPHONE (OUTPATIENT)
Dept: PRIMARY CARE CLINIC | Facility: CLINIC | Age: 69
End: 2024-08-28
Payer: COMMERCIAL

## 2024-08-30 ENCOUNTER — HOSPITAL ENCOUNTER (OUTPATIENT)
Dept: CARDIOLOGY | Facility: HOSPITAL | Age: 69
Discharge: HOME | End: 2024-08-30
Attending: INTERNAL MEDICINE
Payer: COMMERCIAL

## 2024-08-30 DIAGNOSIS — I65.23 BILATERAL CAROTID ARTERY STENOSIS: ICD-10-CM

## 2024-08-30 PROCEDURE — 93880 EXTRACRANIAL BILAT STUDY: CPT

## 2024-08-30 PROCEDURE — 93880 EXTRACRANIAL BILAT STUDY: CPT | Mod: 26,,, | Performed by: SPECIALIST

## 2024-08-31 LAB
LEFT CCA DIST DIAS: 18 CM/S
LEFT CCA DIST SYS: 118 CM/S
LEFT CCA PROX DIAS: 19 CM/S
LEFT CCA PROX SYS: 141 CM/S
LEFT ECA DIAS: 0 CM/S
LEFT ECA SYS: 155 CM/S
LEFT ICA DIST DIAS: 20 CM/S
LEFT ICA DIST SYS: 105 CM/S
LEFT ICA MID DIAS: 20 CM/S
LEFT ICA MID SYS: 162 CM/S
LEFT ICA PROX DIAS: 38 CM/S
LEFT ICA PROX SYS: 188 CM/S
LEFT VERTEBRAL DIAS: 0 CM/S
LEFT VERTEBRAL SYS: 79 CM/S
OHS CV CAROTID RIGHT ICA EDV HIGHEST: 52
OHS CV CAROTID ULTRASOUND LEFT ICA/CCA RATIO: 1.59
OHS CV CAROTID ULTRASOUND RIGHT ICA/CCA RATIO: 8.09
OHS CV PV CAROTID LEFT HIGHEST CCA: 141
OHS CV PV CAROTID LEFT HIGHEST ICA: 188
OHS CV PV CAROTID RIGHT HIGHEST CCA: 67
OHS CV PV CAROTID RIGHT HIGHEST ICA: 372
OHS CV US CAROTID LEFT HIGHEST EDV: 38
RIGHT CCA DIST DIAS: 3 CM/S
RIGHT CCA DIST SYS: 46 CM/S
RIGHT CCA PROX DIAS: 0 CM/S
RIGHT CCA PROX SYS: 67 CM/S
RIGHT ECA DIAS: 0 CM/S
RIGHT ECA SYS: 93 CM/S
RIGHT ICA DIST DIAS: 8 CM/S
RIGHT ICA DIST SYS: 42 CM/S
RIGHT ICA MID DIAS: 0 CM/S
RIGHT ICA MID SYS: 166 CM/S
RIGHT ICA PROX DIAS: 52 CM/S
RIGHT ICA PROX SYS: 372 CM/S
RIGHT VERTEBRAL DIAS: 13 CM/S
RIGHT VERTEBRAL SYS: 97 CM/S

## 2024-09-18 ENCOUNTER — TELEPHONE (OUTPATIENT)
Dept: PRIMARY CARE CLINIC | Facility: CLINIC | Age: 69
End: 2024-09-18
Payer: COMMERCIAL

## 2024-09-18 NOTE — TELEPHONE ENCOUNTER
----- Message from Shahida Bartlett LPN sent at 9/18/2024 12:37 PM CDT -----  Regarding: FW: sooner appt  Please contact to schedule a sooner appointment if possible. Thanks  ----- Message -----  From: Carol Olivares  Sent: 9/18/2024  12:09 PM CDT  To: [facility] Staff  Subject: sooner appt                                      Who Called: Jd Nelson    Caller is requesting a sooner appointment. Caller declined first available appointment listed below. Caller will not accept being placed on the waitlist and is requesting a message be sent to doctor.    When is the first available appointment?11/12  Options offered (Virtual Visit, Urgent Care):   Symptoms:depression consult      Preferred Method of Contact: Phone Call  Patient's Preferred Phone Number on File: [phone]   Best Call Back Number, if different:  Additional Information: stated that he is wanting to start depression meds but is needing an appt. Please advise

## 2024-09-25 ENCOUNTER — OFFICE VISIT (OUTPATIENT)
Dept: CARDIAC SURGERY | Facility: CLINIC | Age: 69
End: 2024-09-25
Payer: COMMERCIAL

## 2024-09-25 VITALS
SYSTOLIC BLOOD PRESSURE: 124 MMHG | WEIGHT: 176 LBS | HEART RATE: 96 BPM | HEIGHT: 65 IN | OXYGEN SATURATION: 96 % | BODY MASS INDEX: 29.32 KG/M2 | DIASTOLIC BLOOD PRESSURE: 86 MMHG

## 2024-09-25 DIAGNOSIS — I65.23 BILATERAL CAROTID ARTERY STENOSIS: ICD-10-CM

## 2024-09-25 DIAGNOSIS — Z01.818 PRE-OP TESTING: ICD-10-CM

## 2024-09-25 DIAGNOSIS — I65.29 OCCLUSION AND STENOSIS OF UNSPECIFIED CAROTID ARTERY: Primary | ICD-10-CM

## 2024-09-25 PROCEDURE — 1160F RVW MEDS BY RX/DR IN RCRD: CPT | Mod: CPTII,,, | Performed by: THORACIC SURGERY (CARDIOTHORACIC VASCULAR SURGERY)

## 2024-09-25 PROCEDURE — 1101F PT FALLS ASSESS-DOCD LE1/YR: CPT | Mod: CPTII,,, | Performed by: THORACIC SURGERY (CARDIOTHORACIC VASCULAR SURGERY)

## 2024-09-25 PROCEDURE — 3079F DIAST BP 80-89 MM HG: CPT | Mod: CPTII,,, | Performed by: THORACIC SURGERY (CARDIOTHORACIC VASCULAR SURGERY)

## 2024-09-25 PROCEDURE — 99204 OFFICE O/P NEW MOD 45 MIN: CPT | Mod: ,,, | Performed by: THORACIC SURGERY (CARDIOTHORACIC VASCULAR SURGERY)

## 2024-09-25 PROCEDURE — 3288F FALL RISK ASSESSMENT DOCD: CPT | Mod: CPTII,,, | Performed by: THORACIC SURGERY (CARDIOTHORACIC VASCULAR SURGERY)

## 2024-09-25 PROCEDURE — 3008F BODY MASS INDEX DOCD: CPT | Mod: CPTII,,, | Performed by: THORACIC SURGERY (CARDIOTHORACIC VASCULAR SURGERY)

## 2024-09-25 PROCEDURE — 1126F AMNT PAIN NOTED NONE PRSNT: CPT | Mod: CPTII,,, | Performed by: THORACIC SURGERY (CARDIOTHORACIC VASCULAR SURGERY)

## 2024-09-25 PROCEDURE — 1159F MED LIST DOCD IN RCRD: CPT | Mod: CPTII,,, | Performed by: THORACIC SURGERY (CARDIOTHORACIC VASCULAR SURGERY)

## 2024-09-25 PROCEDURE — 3074F SYST BP LT 130 MM HG: CPT | Mod: CPTII,,, | Performed by: THORACIC SURGERY (CARDIOTHORACIC VASCULAR SURGERY)

## 2024-09-25 PROCEDURE — 3044F HG A1C LEVEL LT 7.0%: CPT | Mod: CPTII,,, | Performed by: THORACIC SURGERY (CARDIOTHORACIC VASCULAR SURGERY)

## 2024-09-25 NOTE — PROGRESS NOTES
"History & Physical    SUBJECTIVE:     History of Present Illness:  The patient is presenting for evaluation for bilateral carotid artery stenosis anterior mediastinal mass and peripheral vascular disease.  He has no symptoms of dizziness and no localizing symptoms.  He does not report any claudication with ambulation.  He reports some pain over his left buttock site.    Chief Complaint   Patient presents with    Pre-op Exam     REFERRAL-DR. RODOLFO NAVARRO. CEA. DX: BILAT CAROTID STENOSIS, HIGH GRADE RT ICA, APPROX 50% LT ICA, ENLARGED ANT MEDIASTINAL LYMPH NOTE VS SMALL MASS THYMOMA. PMH: CLL, PAD, DIVERTICULOSIS, HTN, IBS, DYSLIPIDEMIA.       Review of patient's allergies indicates:  No Known Allergies    No current outpatient medications on file.     No current facility-administered medications for this visit.       Past Medical History:   Diagnosis Date    Dyslipidemia 4/5/2023    Irritable bowel syndrome 4/5/2023    Primary hypertension 4/5/2023     History reviewed. No pertinent surgical history.  Family History   Problem Relation Name Age of Onset    Asthma Sister      Diabetes Brother       Social History     Tobacco Use    Smoking status: Never    Smokeless tobacco: Never   Substance Use Topics    Alcohol use: Yes    Drug use: Never        Review of Systems:  Review of Systems   Constitutional: Negative.    HENT: Negative.     Eyes: Negative.    Respiratory: Negative.     Cardiovascular: Negative.    Gastrointestinal: Negative.    Endocrine: Negative.    Genitourinary: Negative.    Musculoskeletal: Negative.         Claudications   Skin: Negative.    Allergic/Immunologic: Negative.    Neurological: Negative.    Hematological: Negative.    Psychiatric/Behavioral: Negative.         OBJECTIVE:     Vital Signs (Most Recent)  Pulse: 96 (09/25/24 0950)  BP: 124/86 (09/25/24 0950)  SpO2: 96 % (09/25/24 0950)  5' 5" (1.651 m)  79.8 kg (176 lb)     Physical Exam:  Physical Exam  Vitals reviewed.   Constitutional:       " Appearance: Normal appearance.   HENT:      Head: Normocephalic and atraumatic.      Nose: Nose normal.      Mouth/Throat:      Mouth: Mucous membranes are dry.      Pharynx: Oropharynx is clear.   Eyes:      Extraocular Movements: Extraocular movements intact.      Conjunctiva/sclera: Conjunctivae normal.      Pupils: Pupils are equal, round, and reactive to light.   Cardiovascular:      Rate and Rhythm: Normal rate and regular rhythm.      Pulses: Normal pulses.   Pulmonary:      Effort: Pulmonary effort is normal.      Breath sounds: Normal breath sounds.   Abdominal:      General: Abdomen is flat.      Palpations: Abdomen is soft.   Musculoskeletal:         General: Normal range of motion.      Cervical back: Neck supple.   Skin:     General: Skin is warm and dry.   Neurological:      General: No focal deficit present.   Psychiatric:         Mood and Affect: Mood normal.         Laboratory:  None      Diagnostic Results:  CTA of the chest reviewed CTA of the neck soft tissue reviewed ultrasound of the carotid reviewed      ASSESSMENT/PLAN:     Anterior mediastinal mass.  The patient will benefit from resection.  Severe carotid artery disease.  The patient will need CTA of the carotid for further evaluation.  He might be needing carotid endarterectomy.  The patient will need also an evaluation with Cardiology I will refer him to Dr. Tripp.  In the meantime I advised the patient to stay on aspirin

## 2024-11-22 DIAGNOSIS — I65.23 BILATERAL CAROTID ARTERY STENOSIS: Primary | ICD-10-CM

## 2024-11-25 ENCOUNTER — HOSPITAL ENCOUNTER (OUTPATIENT)
Dept: RADIOLOGY | Facility: HOSPITAL | Age: 69
Discharge: HOME | End: 2024-11-25
Attending: THORACIC SURGERY (CARDIOTHORACIC VASCULAR SURGERY)
Payer: MEDICARE

## 2024-11-25 ENCOUNTER — ANESTHESIA EVENT (OUTPATIENT)
Dept: SURGERY | Facility: HOSPITAL | Age: 69
DRG: 039 | End: 2024-11-25
Payer: MEDICARE

## 2024-11-25 DIAGNOSIS — I65.23 BILATERAL CAROTID ARTERY STENOSIS: Primary | ICD-10-CM

## 2024-11-25 DIAGNOSIS — I65.23 BILATERAL CAROTID ARTERY STENOSIS: ICD-10-CM

## 2024-11-25 PROCEDURE — 71046 X-RAY EXAM CHEST 2 VIEWS: CPT | Mod: TC

## 2024-11-25 RX ORDER — ASPIRIN 81 MG/1
81 TABLET ORAL DAILY
Status: ON HOLD | COMMUNITY
End: 2024-11-27 | Stop reason: HOSPADM

## 2024-11-25 RX ORDER — IBUPROFEN 100 MG/5ML
1000 SUSPENSION, ORAL (FINAL DOSE FORM) ORAL DAILY
COMMUNITY

## 2024-11-25 RX ORDER — ROSUVASTATIN CALCIUM 5 MG/1
5 TABLET, COATED ORAL NIGHTLY
COMMUNITY
Start: 2024-10-25

## 2024-11-25 NOTE — CLINICAL REVIEW
labs, CXR, EKG, and cardiology documents reviewed. Office aware of abn results as per reviewers' list. CRA noted. crc sd

## 2024-11-25 NOTE — PRE-PROCEDURE INSTRUCTIONS
"[facility]: Outpatient Surgery  Preprocedure Check-In Instructions     Your surgeon's office will call you with time of arrival.   We ask patients to arrive about 2 hours before surgery to allow for enough time to review your health history & medications, start your IV, complete any outstanding labwork or tests, and meet your Anesthesiologist.    Expectations: "Because of inconsistent procedure completion times, an unexpected wait may occur. The Physicians would like you to be here to prepare in the event they run ahead of time. We will make you as comfortable as possible and keep you informed. We apologize in advance if this happens."    You will arrive at [facility], [address], [place].  Enter through the [place] entrance next to the Emergency Room, and come to the 6th floor to the Outpatient Surgery Department.     Visitory Policy:  You are allowed 2 adult visitors to be with you in the hospital. All hospital visitors should be in good current health.  No small children.     What to Bring:  Please have your ID, insurance cards, and all home medication bottles with you at check in.  Bring your CPAP machine if one is used at home.     Fasting:  Nothing to eat after midnight the night before your procedure. This includes no gum and/or tobacco products. You may have clear liquids limited to only: WATER, GATORADE, POWERADE and children can also have PEDIALYTE AND/OR APPLE JUICE , up until 2 hours before your arrival time.   Follow your doctor's instructions for taking any medications on the morning of your procedure.  If no instructions for taking medications were given, do not take any medications but bring your medications in their bottles to your procedure check in.     Follow your doctor's preoperative instructions regarding skin prep, bowel prep, bathing, or showering prior to your procedure.  If any special soaps were provided to you, please use according to " your doctor's instructions. If no instructions were given from your doctor, take a good bath or shower with antibacterial soap the night before and the morning of your procedure.  On the morning of procedure, wear loose, comfortable clothing.  No lotions, makeup, perfumes, colognes, deodorant, or jewelry to your procedure.  Removable items (glasses, contact lenses, dentures, retainers, hearing aids) need to be removed for your procedure.  Bring your storage containers for these items if you wear them.     Artificial nails, body jewelry, eyelash extensions, and/or hair extensions with metal clips are not allowed during your surgery.  If you currently wear any of these items, please arrange for them to be removed prior to your arrival to the hospital.     Outpatient or Same Day Surgeries:  Any patients receiving sedation/anesthesia are advised not to drive for 24 hours after their procedure.  We do not allow patients to drive themselves home after discharge.  If you are going home after your procedure, please have someone available to drive you home from the hospital.        You may call the Outpatient Surgery Department at [phone] with any questions or concerns.  We are looking forward to meeting you and taking great care of you for your procedure.  Thank you for choosing [facility] for your surgical needs.

## 2024-11-26 ENCOUNTER — ANESTHESIA (OUTPATIENT)
Dept: SURGERY | Facility: HOSPITAL | Age: 69
DRG: 039 | End: 2024-11-26
Payer: MEDICARE

## 2024-11-26 ENCOUNTER — TELEPHONE (OUTPATIENT)
Dept: HEMATOLOGY/ONCOLOGY | Facility: CLINIC | Age: 69
End: 2024-11-26
Payer: MEDICARE

## 2024-11-26 ENCOUNTER — HOSPITAL ENCOUNTER (INPATIENT)
Facility: HOSPITAL | Age: 69
LOS: 1 days | Discharge: HOME | DRG: 039 | End: 2024-11-27
Attending: THORACIC SURGERY (CARDIOTHORACIC VASCULAR SURGERY) | Admitting: THORACIC SURGERY (CARDIOTHORACIC VASCULAR SURGERY)
Payer: MEDICARE

## 2024-11-26 DIAGNOSIS — R97.20 ELEVATED PSA, BETWEEN 10 AND LESS THAN 20 NG/ML: Primary | Chronic | ICD-10-CM

## 2024-11-26 DIAGNOSIS — I65.23 BILATERAL CAROTID ARTERY STENOSIS: ICD-10-CM

## 2024-11-26 LAB — POTASSIUM SERPL-SCNC: 4.1 MMOL/L (ref 3.5–5.1)

## 2024-11-26 PROCEDURE — 36000706: Performed by: THORACIC SURGERY (CARDIOTHORACIC VASCULAR SURGERY)

## 2024-11-26 PROCEDURE — 63600175 PHARM REV CODE 636 W HCPCS: Performed by: NURSE ANESTHETIST, CERTIFIED REGISTERED

## 2024-11-26 PROCEDURE — 25000003 PHARM REV CODE 250: Performed by: NURSE ANESTHETIST, CERTIFIED REGISTERED

## 2024-11-26 PROCEDURE — 71000033 HC RECOVERY, INTIAL HOUR: Performed by: THORACIC SURGERY (CARDIOTHORACIC VASCULAR SURGERY)

## 2024-11-26 PROCEDURE — 35301 RECHANNELING OF ARTERY: CPT | Mod: RT,,, | Performed by: THORACIC SURGERY (CARDIOTHORACIC VASCULAR SURGERY)

## 2024-11-26 PROCEDURE — D9220A PRA ANESTHESIA: Mod: ANES,,, | Performed by: ANESTHESIOLOGY

## 2024-11-26 PROCEDURE — 63600175 PHARM REV CODE 636 W HCPCS: Performed by: THORACIC SURGERY (CARDIOTHORACIC VASCULAR SURGERY)

## 2024-11-26 PROCEDURE — 88304 TISSUE EXAM BY PATHOLOGIST: CPT | Performed by: THORACIC SURGERY (CARDIOTHORACIC VASCULAR SURGERY)

## 2024-11-26 PROCEDURE — 88311 DECALCIFY TISSUE: CPT

## 2024-11-26 PROCEDURE — 99223 1ST HOSP IP/OBS HIGH 75: CPT | Mod: 57,,, | Performed by: PHYSICIAN ASSISTANT

## 2024-11-26 PROCEDURE — 21400001 HC TELEMETRY ROOM

## 2024-11-26 PROCEDURE — 35301 RECHANNELING OF ARTERY: CPT | Mod: AS,RT,, | Performed by: PHYSICIAN ASSISTANT

## 2024-11-26 PROCEDURE — 63600175 PHARM REV CODE 636 W HCPCS: Performed by: ANESTHESIOLOGY

## 2024-11-26 PROCEDURE — 27201423 OPTIME MED/SURG SUP & DEVICES STERILE SUPPLY: Performed by: THORACIC SURGERY (CARDIOTHORACIC VASCULAR SURGERY)

## 2024-11-26 PROCEDURE — 84132 ASSAY OF SERUM POTASSIUM: CPT | Performed by: THORACIC SURGERY (CARDIOTHORACIC VASCULAR SURGERY)

## 2024-11-26 PROCEDURE — C1768 GRAFT, VASCULAR: HCPCS | Performed by: THORACIC SURGERY (CARDIOTHORACIC VASCULAR SURGERY)

## 2024-11-26 PROCEDURE — 37000009 HC ANESTHESIA EA ADD 15 MINS: Performed by: THORACIC SURGERY (CARDIOTHORACIC VASCULAR SURGERY)

## 2024-11-26 PROCEDURE — D9220A PRA ANESTHESIA: Mod: CRNA,,, | Performed by: NURSE ANESTHETIST, CERTIFIED REGISTERED

## 2024-11-26 PROCEDURE — 25000003 PHARM REV CODE 250: Performed by: PHYSICIAN ASSISTANT

## 2024-11-26 PROCEDURE — 36415 COLL VENOUS BLD VENIPUNCTURE: CPT | Performed by: THORACIC SURGERY (CARDIOTHORACIC VASCULAR SURGERY)

## 2024-11-26 PROCEDURE — 36000707: Performed by: THORACIC SURGERY (CARDIOTHORACIC VASCULAR SURGERY)

## 2024-11-26 PROCEDURE — 37000008 HC ANESTHESIA 1ST 15 MINUTES: Performed by: THORACIC SURGERY (CARDIOTHORACIC VASCULAR SURGERY)

## 2024-11-26 DEVICE — XENOSURE BIOLOGIC PATCH, 0.8CM X 8CM, EIFU
Type: IMPLANTABLE DEVICE | Site: CAROTID | Status: FUNCTIONAL
Brand: XENOSURE BIOLOGIC PATCH

## 2024-11-26 RX ORDER — HYDRALAZINE HYDROCHLORIDE 20 MG/ML
10 INJECTION INTRAMUSCULAR; INTRAVENOUS EVERY 4 HOURS PRN
Status: DISCONTINUED | OUTPATIENT
Start: 2024-11-26 | End: 2024-11-27 | Stop reason: HOSPADM

## 2024-11-26 RX ORDER — VASOPRESSIN 20 [USP'U]/ML
INJECTION, SOLUTION INTRAMUSCULAR; SUBCUTANEOUS
Status: DISCONTINUED | OUTPATIENT
Start: 2024-11-26 | End: 2024-11-26

## 2024-11-26 RX ORDER — PROTAMINE SULFATE 10 MG/ML
INJECTION, SOLUTION INTRAVENOUS
Status: DISCONTINUED | OUTPATIENT
Start: 2024-11-26 | End: 2024-11-26

## 2024-11-26 RX ORDER — PHENYLEPHRINE HYDROCHLORIDE 10 MG/ML
INJECTION INTRAVENOUS
Status: DISCONTINUED | OUTPATIENT
Start: 2024-11-26 | End: 2024-11-26

## 2024-11-26 RX ORDER — ROCURONIUM BROMIDE 10 MG/ML
INJECTION, SOLUTION INTRAVENOUS
Status: DISCONTINUED | OUTPATIENT
Start: 2024-11-26 | End: 2024-11-26

## 2024-11-26 RX ORDER — PROPOFOL 10 MG/ML
VIAL (ML) INTRAVENOUS
Status: DISCONTINUED | OUTPATIENT
Start: 2024-11-26 | End: 2024-11-26

## 2024-11-26 RX ORDER — ASPIRIN 325 MG
325 TABLET, DELAYED RELEASE (ENTERIC COATED) ORAL DAILY
Status: DISCONTINUED | OUTPATIENT
Start: 2024-11-26 | End: 2024-11-27 | Stop reason: HOSPADM

## 2024-11-26 RX ORDER — HEPARIN SODIUM 1000 [USP'U]/ML
INJECTION, SOLUTION INTRAVENOUS; SUBCUTANEOUS
Status: DISCONTINUED | OUTPATIENT
Start: 2024-11-26 | End: 2024-11-26

## 2024-11-26 RX ORDER — DEXAMETHASONE SODIUM PHOSPHATE 4 MG/ML
INJECTION, SOLUTION INTRA-ARTICULAR; INTRALESIONAL; INTRAMUSCULAR; INTRAVENOUS; SOFT TISSUE
Status: DISCONTINUED | OUTPATIENT
Start: 2024-11-26 | End: 2024-11-26

## 2024-11-26 RX ORDER — MUPIROCIN 20 MG/G
OINTMENT TOPICAL 2 TIMES DAILY
Status: DISCONTINUED | OUTPATIENT
Start: 2024-11-26 | End: 2024-11-27 | Stop reason: HOSPADM

## 2024-11-26 RX ORDER — CEFUROXIME SODIUM 1.5 G/16ML
1.5 INJECTION, POWDER, FOR SOLUTION INTRAVENOUS
Status: COMPLETED | OUTPATIENT
Start: 2024-11-26 | End: 2024-11-26

## 2024-11-26 RX ORDER — GLYCOPYRROLATE 0.2 MG/ML
INJECTION INTRAMUSCULAR; INTRAVENOUS
Status: DISCONTINUED | OUTPATIENT
Start: 2024-11-26 | End: 2024-11-26

## 2024-11-26 RX ORDER — FENTANYL CITRATE 50 UG/ML
INJECTION, SOLUTION INTRAMUSCULAR; INTRAVENOUS
Status: DISCONTINUED | OUTPATIENT
Start: 2024-11-26 | End: 2024-11-26

## 2024-11-26 RX ORDER — LIDOCAINE HYDROCHLORIDE 20 MG/ML
INJECTION, SOLUTION EPIDURAL; INFILTRATION; INTRACAUDAL; PERINEURAL
Status: DISCONTINUED | OUTPATIENT
Start: 2024-11-26 | End: 2024-11-26

## 2024-11-26 RX ORDER — HYDROCODONE BITARTRATE AND ACETAMINOPHEN 5; 325 MG/1; MG/1
1 TABLET ORAL EVERY 4 HOURS PRN
Status: DISCONTINUED | OUTPATIENT
Start: 2024-11-26 | End: 2024-11-27 | Stop reason: HOSPADM

## 2024-11-26 RX ORDER — MIDAZOLAM HYDROCHLORIDE 2 MG/2ML
INJECTION, SOLUTION INTRAMUSCULAR; INTRAVENOUS
Status: DISPENSED
Start: 2024-11-26 | End: 2024-11-26

## 2024-11-26 RX ORDER — HEPARIN SODIUM 200 [USP'U]/100ML
INJECTION, SOLUTION INTRAVENOUS
Status: COMPLETED | OUTPATIENT
Start: 2024-11-26 | End: 2024-11-26

## 2024-11-26 RX ORDER — LABETALOL HYDROCHLORIDE 5 MG/ML
10 INJECTION, SOLUTION INTRAVENOUS ONCE
Status: COMPLETED | OUTPATIENT
Start: 2024-11-26 | End: 2024-11-26

## 2024-11-26 RX ORDER — ONDANSETRON HYDROCHLORIDE 2 MG/ML
INJECTION, SOLUTION INTRAVENOUS
Status: DISCONTINUED | OUTPATIENT
Start: 2024-11-26 | End: 2024-11-26

## 2024-11-26 RX ADMIN — DEXAMETHASONE SODIUM PHOSPHATE 4 MG: 4 INJECTION, SOLUTION INTRA-ARTICULAR; INTRALESIONAL; INTRAMUSCULAR; INTRAVENOUS; SOFT TISSUE at 12:11

## 2024-11-26 RX ADMIN — ROCURONIUM BROMIDE 50 MG: 10 SOLUTION INTRAVENOUS at 12:11

## 2024-11-26 RX ADMIN — MUPIROCIN: 20 OINTMENT TOPICAL at 09:11

## 2024-11-26 RX ADMIN — PHENYLEPHRINE HYDROCHLORIDE 20 MCG/KG/MIN: 10 INJECTION INTRAVENOUS at 12:11

## 2024-11-26 RX ADMIN — PHENYLEPHRINE HYDROCHLORIDE 200 MCG: 10 INJECTION INTRAVENOUS at 12:11

## 2024-11-26 RX ADMIN — FENTANYL CITRATE 100 MCG: 50 INJECTION, SOLUTION INTRAMUSCULAR; INTRAVENOUS at 12:11

## 2024-11-26 RX ADMIN — VASOPRESSIN 1 UNITS: 20 INJECTION INTRAVENOUS at 01:11

## 2024-11-26 RX ADMIN — ASPIRIN 325 MG: 325 TABLET, COATED ORAL at 03:11

## 2024-11-26 RX ADMIN — LABETALOL HYDROCHLORIDE 10 MG: 5 INJECTION, SOLUTION INTRAVENOUS at 02:11

## 2024-11-26 RX ADMIN — GLYCOPYRROLATE 0.2 MG: 0.2 INJECTION INTRAMUSCULAR; INTRAVENOUS at 12:11

## 2024-11-26 RX ADMIN — ROCURONIUM BROMIDE 20 MG: 10 SOLUTION INTRAVENOUS at 12:11

## 2024-11-26 RX ADMIN — LIDOCAINE HYDROCHLORIDE 4 ML: 20 INJECTION, SOLUTION INTRAVENOUS at 12:11

## 2024-11-26 RX ADMIN — SUGAMMADEX 200 MG: 100 INJECTION, SOLUTION INTRAVENOUS at 01:11

## 2024-11-26 RX ADMIN — PHENYLEPHRINE HYDROCHLORIDE 200 MCG: 10 INJECTION INTRAVENOUS at 01:11

## 2024-11-26 RX ADMIN — VASOPRESSIN 1 UNITS: 20 INJECTION INTRAVENOUS at 12:11

## 2024-11-26 RX ADMIN — HEPARIN SODIUM 10000 UNITS: 1000 INJECTION, SOLUTION INTRAVENOUS; SUBCUTANEOUS at 12:11

## 2024-11-26 RX ADMIN — CEFUROXIME 1.5 G: 1.5 INJECTION, POWDER, FOR SOLUTION INTRAVENOUS at 12:11

## 2024-11-26 RX ADMIN — PROPOFOL 200 MG: 10 INJECTION, EMULSION INTRAVENOUS at 12:11

## 2024-11-26 RX ADMIN — ONDANSETRON 4 MG: 2 INJECTION INTRAMUSCULAR; INTRAVENOUS at 01:11

## 2024-11-26 RX ADMIN — PROTAMINE SULFATE 100 MG: 10 INJECTION, SOLUTION INTRAVENOUS at 01:11

## 2024-11-26 NOTE — TELEPHONE ENCOUNTER
----- Message from Jo Ann sent at 11/26/2024 10:22 AM CST -----  Regarding: LABS  Wife, Jenn,  called to see if pre-op labs that were drawn today will take care of our labs that we need.   Can you please reach out to her?  Thank you

## 2024-11-26 NOTE — H&P
CT SURGERY PROGRESS NOTE  Jd Nelson  69 y.o.  1955    Patients Procedure: Procedure(s) (LRB):  ENDARTERECTOMY-CAROTID (Right)    Subjective  Interval History:   History of Present Illness:  The patient is presenting for evaluation for bilateral carotid artery stenosis anterior mediastinal mass and peripheral vascular disease.  He has no symptoms of dizziness and no localizing symptoms.  He does not report any claudication with ambulation.  He reports some pain over his left buttock site.          Chief Complaint   Patient presents with    Pre-op Exam       REFERRAL-DR. RODOLFO NAVARRO. CEA. DX: BILAT CAROTID STENOSIS, HIGH GRADE RT ICA, APPROX 50% LT ICA, ENLARGED ANT MEDIASTINAL LYMPH NOTE VS SMALL MASS THYMOMA. PMH: CLL, PAD, DIVERTICULOSIS, HTN, IBS, DYSLIPIDEMIA.         Review of patient's allergies indicates:  No Known Allergies     Current Medications   No current outpatient medications on file.      No current facility-administered medications for this visit.                 Past Medical History:   Diagnosis Date    Dyslipidemia 4/5/2023    Irritable bowel syndrome 4/5/2023    Primary hypertension 4/5/2023      History reviewed. No pertinent surgical history.         Family History   Problem Relation Name Age of Onset    Asthma Sister        Diabetes Brother          Social History   Social History           Tobacco Use    Smoking status: Never    Smokeless tobacco: Never   Substance Use Topics    Alcohol use: Yes    Drug use: Never            Review of Systems:  Review of Systems   Constitutional: Negative.    HENT: Negative.     Eyes: Negative.    Respiratory: Negative.     Cardiovascular: Negative.    Gastrointestinal: Negative.    Endocrine: Negative.    Genitourinary: Negative.    Musculoskeletal: Negative.         Claudications   Skin: Negative.    Allergic/Immunologic: Negative.    Neurological: Negative.    Hematological: Negative.    Psychiatric/Behavioral: Negative.           OBJECTIVE:     "  Vital Signs (Most Recent)  Pulse: 96 (09/25/24 0950)  BP: 124/86 (09/25/24 0950)  SpO2: 96 % (09/25/24 0950)  5' 5" (1.651 m)  79.8 kg (176 lb)      Physical Exam:  Physical Exam  Vitals reviewed.   Constitutional:       Appearance: Normal appearance.   HENT:      Head: Normocephalic and atraumatic.      Nose: Nose normal.      Mouth/Throat:      Mouth: Mucous membranes are dry.      Pharynx: Oropharynx is clear.   Eyes:      Extraocular Movements: Extraocular movements intact.      Conjunctiva/sclera: Conjunctivae normal.      Pupils: Pupils are equal, round, and reactive to light.   Cardiovascular:      Rate and Rhythm: Normal rate and regular rhythm.      Pulses: Normal pulses.   Pulmonary:      Effort: Pulmonary effort is normal.      Breath sounds: Normal breath sounds.   Abdominal:      General: Abdomen is flat.      Palpations: Abdomen is soft.   Musculoskeletal:         General: Normal range of motion.      Cervical back: Neck supple.   Skin:     General: Skin is warm and dry.   Neurological:      General: No focal deficit present.   Psychiatric:         Mood and Affect: Mood normal.     Medication List  Infusions    Scheduled   cefUROXime (ZINACEF) IVPB  1.5 g Intravenous Once Pre-Op       Objective:  Recent Vitals:  Temp:  [98.2 °F (36.8 °C)] 98.2 °F (36.8 °C)  Pulse:  [94] 94  Resp:  [18] 18  SpO2:  [96 %] 96 %  BP: (189)/(73) 189/73        I/O last 24 hrs:  Intake/Output - Last 3 Shifts       None            Labs  ABGs: No results for input(s): "PH", "PCO2", "PO2", "HCO3", "POCSATURATED", "BE" in the last 48 hours.  BMP:   Recent Labs   Lab 11/25/24  1034      K 5.6*      CO2 29   BUN 13.0   CREATININE 1.18   CALCIUM 10.4*     CBC:   Recent Labs   Lab 11/25/24  1034   WBC 19.04  19.04*   RBC 4.90   HGB 15.2   HCT 45.5      MCV 92.9   MCH 31.0   MCHC 33.4     CMP:   Recent Labs   Lab 11/25/24  1034   CALCIUM 10.4*   ALBUMIN 4.3      K 5.6*   CO2 29      BUN 13.0 "   CREATININE 1.18   ALKPHOS 61   ALT 27   AST 24   BILITOT 0.4         Imaging:   CT: No results found in the last 24 hours.  CXR: X-Ray Chest PA And Lateral    Result Date: 11/25/2024  No acute cardiopulmonary process identified. Electronically signed by: Mickey Stacy Date:    11/25/2024 Time:    11:11   Images for Vi[facility] [place] Viewer     Show images for CTA Neck  CTA Neck  Order: [id number]  Status: Final result       Visible to patient: Yes (seen)       Next appt: 12/03/2024 at 08:00 AM in Lab (LAB, [facility])       Dx: Pre-op testing; Bilateral carotid art...    0 Result Notes  Details    Reading Physician Reading Date Result Priority   Raimundo Tuttle MD  [phone] 9/30/2024 Routine     Narrative & Impression  EXAMINATION:  CTA NECK     CLINICAL HISTORY:  Carotid artery stenosis;Occlusion and stenosis of bilateral carotid arteries     TECHNIQUE:  CT angiogram was performed from the level of the kevin to the EAC following the IV administration of 75mL of Omnipaque 350.   Sagittal and coronal reconstructions and maximum intensity projection reconstructions were performed. Arterial stenosis percentages are based on NASCET measurement criteria.     COMPARISON:  CT soft tissue neck August 20, 2024     FINDINGS:  There is a 3 vessel aortic arch.  The left subclavian artery is occluded at origin, with retrograde flow supplied via the left vertebral artery.  The brachiocephalic and left common carotid artery are patent.  The right subclavian artery is patent.     The common carotid arteries have a normal caliber.  There is at least 85% stenosis in the right internal carotid artery proximally secondary to a large soft plaque.  At the proximal left internal carotid artery, there is a mix calcified and noncalcified plaque resulting in approximately 75% stenosis.     The vertebral arteries arise from the subclavian arteries.  The vertebral arteries are symmetric in size.  There is no evidence of flow-limiting  stenosis or occlusion of the vertebral arteries.     Disc disease is advanced at C5-C6, C6-C7, and C7-T1.  A 2.6 cm mucosal retention cyst or polyp is present at the left maxillary sinus floor.  A 1 cm mucosal retention cyst or polyp is present at the right maxillary sinus medial wall.  The lung apices are clear.     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     At least 85% stenosis in the proximal right internal carotid artery.     Approximately 75% proximal left internal carotid artery stenosis.     Additional incidental findings as above        Electronically signed by:Raimundo Tuttle  Date:                                            09/30/2024  Time:                                           08:25     Emotient Status: Active (w/ proxy users)  Results Release     PACS Images for [facility] [place] Viewer     Show images for CT Chest With Contrast  All Reviewers List    Karly Hernandez MD on 8/20/2024 11:52     CT Chest With Contrast  Order: [id number]  Status: Final result       Visible to patient: Yes (seen)       Next appt: 12/03/2024 at 08:00 AM in Lab (LAB, [facility])       Dx: Genetic anomalies of leukocytes    0 Result Notes  Details    Reading Physician Reading Date Result Priority   Buzz Dumas MD  [phone] 8/20/2024 Routine     Narrative & Impression  EXAMINATION:  CT CHEST WITH CONTRAST     CLINICAL HISTORY:  69-year-old man with chronic lymphocytic leukemia diagnosed 7/31/4 and currently undergoing observation.Genetic anomalies of leukocytes     TECHNIQUE:  Thin slice helical CT imaging was performed from above the lung apices through the diaphragm following the administration of 125 mL Omnipaque 350 low osmolar intravenous contrast and with coronal MIP and coronal and sagittal MPR images generated from the axial data set per routine protocol.  Automatic dose modulation and/or weight based  mA/kv utilized to achieve as low as reasonable radiation dose.     COMPARISON:  None     FINDINGS:  The lungs are well expanded and clear without pulmonary consolidation, edema, or significant pulmonary nodule/mass.  There is a subcentimeter calcified granuloma at the peripheral left upper lobe on axial image 16, series 4.  No significant abnormality at the thoracic inlet.  The heart and great vessels are normal in size without pericardial effusion.  There is mild left coronary artery atherosclerotic calcification.  There is advanced atherosclerotic calcification and plaque diffusely along the thoracic aorta.  There is also advanced atherosclerotic calcification and plaque at the proximal 3 cm of the left subclavian artery with at least markedly severe stenosis and possibly short segment occlusion.  There is a single enlarged anterior pre-vascular mediastinal lymph node versus small anterior mediastinal mass measuring 1 x 1.8 x 2.9 cm in greatest AP, TV, and CC dimensions.  There is no additional intrathoracic or axillary adenopathy or mass.  No pathology along the esophagus.  No acute upper abdominal pathology.  Please see the separately dictated dedicated abdomen/pelvis CT report for full abdominal findings.  There is no acute skeletal abnormality or significant osseous lesion at the chest.  There is mild thoracic degenerative disc disease and mild to moderate degenerative arthrosis at the sternoclavicular joints.     Impression:     Single enlarged anterior mediastinal lymph node versus small mass such as thymoma.  No additional adenopathy or mass at the chest.  Additional chronic incidental findings as detailed above including advanced atherosclerotic disease with markedly severe stenosis and possibly short segment occlusion of the proximal most left subclavian artery.  Advanced atherosclerotic disease also demonstrated at the right greater than left carotid bulbs/proximal ICAs and carotid Doppler ultrasound  could evaluate for possible subclavian steal.        Electronically signed by:Buzz Dumas  Date:                                            08/20/2024  Time:                                           11:49        Exam Ended: 08/20/24 08:37 CDT Last Resulted: 08/20/24 11:49 CDT       Order Details        View Encounter        Lab and Collection Details        Routing        Result History     View All Conversations on this Encounter     Related Results     CT Abdomen Pelvis W Wo Contrast Final result 8/20/2024 8:37 AM      Impression     Mild left inguinal lymphadenopathy with no abdominopelvic adenopathy or splenomegaly.    Additional chronic findings as detailed above including distal colonic diverticulitis and severe diffuse atherosclerotic disease with at least high-grade stenosis and possible occlusion at the left proximal common iliac artery.    Nonspecific prominent enlargement of the prostate gland which indents the bladder base.      Electronically signed by: Buzz Dumas  Date: 08/20/2024 ...         Result Care Coordination      Patient Communication                  ASSESSMENT/PLAN:    TOÑO bilat R>L       Admit 11/26/24 for right CEA Dr Greenwood       Case and plan of care discussed with MD Hiram Aaron PA-C

## 2024-11-26 NOTE — ANESTHESIA PROCEDURE NOTES
Intubation    Date/Time: 11/26/2024 12:14 PM    Performed by: Aleksandr Edwards CRNA  Authorized by: Candy Escalante MD    Intubation:     Induction:  Intravenous    Intubated:  Postinduction    Mask Ventilation:  Easy with oral airway    Attempts:  1    Attempted By:  CRNA    Method of Intubation:  Direct    Blade:  Souza 2    Laryngeal View Grade: Grade IIA - cords partially seen      Difficult Airway Encountered?: No      Complications:  None    Airway Device:  Oral endotracheal tube    Airway Device Size:  7.5    Style/Cuff Inflation:  Cuffed (inflated to minimal occlusive pressure)    Inflation Amount (mL):  6    Tube secured:  21    Secured at:  The lips    Placement Verified By:  Capnometry    Complicating Factors:  None    Findings Post-Intubation:  BS equal bilateral and atraumatic/condition of teeth unchanged

## 2024-11-26 NOTE — TRANSFER OF CARE
"Anesthesia Transfer of Care Note    Patient: Jd Nelson    Procedure(s) Performed: Procedure(s) (LRB):  ENDARTERECTOMY-CAROTID (Right)    Patient location: PACU    Anesthesia Type: general    Transport from OR: Transported from OR on room air with adequate spontaneous ventilation    Post pain: adequate analgesia    Post assessment: no apparent anesthetic complications    Post vital signs: stable    Level of consciousness: responds to stimulation    Nausea/Vomiting: no nausea/vomiting    Complications: none    Transfer of care protocol was followed    Last vitals: Visit Vitals  BP (!) 146/66   Pulse 78   Temp 36.8 °C (98.2 °F) (Oral)   Resp 16   Ht 5' 6" (1.676 m)   Wt 76.5 kg (168 lb 10.4 oz)   SpO2 96%   BMI 27.22 kg/m²     "

## 2024-11-26 NOTE — ANESTHESIA PROCEDURE NOTES
Peripheral IV Insertion    Diagnosis: I87.9 Disorder of vein, unspecified.    Patient location during procedure: OR  Timeout: 11/26/2024 12:19 PM  Procedure end time: 11/26/2024 12:19 PM    Staffing  Authorizing Provider: Candy Escalante MD  Performing Provider: Aleksandr Edwards CRNA    Staffing  Performed by: Aleksandr Edwards CRNA  Authorized by: Candy Escalante MD    Anesthesiologist was present at the time of the procedure.    Preanesthetic Checklist  Completed: patient identified, IV checked, site marked, risks and benefits discussed, surgical consent, monitors and equipment checked, pre-op evaluation, timeout performed and anesthesia consent givenPeripheral IV Insertion  Skin Prep: alcohol swabs  Local Infiltration: none  Orientation: right  Location: forearm  Catheter Type: peripheral IV (single lumen)  Catheter Size: 18 G  Catheter placement by Anatomical landmarks. Heme positive aspiration all ports. Insertion Attempts: 1  Assessment  Dressing: secured with tape and tegaderm  Patient: Tolerated well  Line flushed easily.

## 2024-11-26 NOTE — TELEPHONE ENCOUNTER
Patient's wife advised that additional labs are needed. She voiced understanding and stated they would keep the lab appointment.

## 2024-11-26 NOTE — ANESTHESIA PREPROCEDURE EVALUATION
11/26/2024  Jd Nelson is a 69 y.o., male.  DX: BILAT CAROTID STENOSIS, HIGH GRADE RT ICA, APPROX 50% LT ICA, ENLARGED ANT MEDIASTINAL LYMPH NOTE VS SMALL MASS THYMOMA. PMH: CLL, PAD, DIVERTICULOSIS, HTN, IBS, DYSLIPIDEMIA.   COMPARISON:  CT soft tissue neck August 20, 2024     FINDINGS:  There is a 3 vessel aortic arch.  The left subclavian artery is occluded at origin, with retrograde flow supplied via the left vertebral artery.  The brachiocephalic and left common carotid artery are patent.  The right subclavian artery is patent.  At least 85% stenosis in the proximal right internal carotid artery.     Approximately 75% proximal left internal carotid artery stenosis.    Current Dx CLL    Pre-op Assessment    I have reviewed the Patient Summary Reports.     I have reviewed the Nursing Notes. I have reviewed the NPO Status.   I have reviewed the Medications.     Review of Systems  Cardiovascular:     Hypertension                                    Hypertension             Physical Exam  General: Well nourished, Cooperative, Alert and Oriented    Airway:  Mallampati: II   Mouth Opening: Normal  TM Distance: Normal  Tongue: Normal  Neck ROM: Normal ROM    Dental:  Intact        Anesthesia Plan  Type of Anesthesia, risks & benefits discussed:    Anesthesia Type: Gen ETT  Intra-op Monitoring Plan: Standard ASA Monitors and Art Line  Post Op Pain Control Plan: multimodal analgesia and IV/PO Opioids PRN  Airway Plan: Direct, Post-Induction  Informed Consent: Informed consent signed with the Patient and all parties understand the risks and agree with anesthesia plan.  All questions answered. Patient consented to blood products? Yes  ASA Score: 3  Day of Surgery Review of History & Physical: H&P Update referred to the surgeon/provider.    Ready For Surgery From Anesthesia Perspective.     .    RIGHT SIDE  A-line,

## 2024-11-27 VITALS
HEART RATE: 85 BPM | BODY MASS INDEX: 27.1 KG/M2 | WEIGHT: 168.63 LBS | SYSTOLIC BLOOD PRESSURE: 154 MMHG | OXYGEN SATURATION: 94 % | HEIGHT: 66 IN | RESPIRATION RATE: 16 BRPM | TEMPERATURE: 99 F | DIASTOLIC BLOOD PRESSURE: 76 MMHG

## 2024-11-27 PROCEDURE — 25000003 PHARM REV CODE 250: Performed by: PHYSICIAN ASSISTANT

## 2024-11-27 PROCEDURE — 03UK0KZ SUPPLEMENT RIGHT INTERNAL CAROTID ARTERY WITH NONAUTOLOGOUS TISSUE SUBSTITUTE, OPEN APPROACH: ICD-10-PCS | Performed by: THORACIC SURGERY (CARDIOTHORACIC VASCULAR SURGERY)

## 2024-11-27 PROCEDURE — 03CK0ZZ EXTIRPATION OF MATTER FROM RIGHT INTERNAL CAROTID ARTERY, OPEN APPROACH: ICD-10-PCS | Performed by: THORACIC SURGERY (CARDIOTHORACIC VASCULAR SURGERY)

## 2024-11-27 PROCEDURE — 99024 POSTOP FOLLOW-UP VISIT: CPT | Mod: POP,,, | Performed by: PHYSICIAN ASSISTANT

## 2024-11-27 RX ORDER — ASPIRIN 325 MG
325 TABLET, DELAYED RELEASE (ENTERIC COATED) ORAL DAILY
Qty: 30 TABLET | Refills: 0 | Status: SHIPPED | OUTPATIENT
Start: 2024-11-27 | End: 2025-11-27

## 2024-11-27 RX ORDER — HYDROCODONE BITARTRATE AND ACETAMINOPHEN 5; 325 MG/1; MG/1
1 TABLET ORAL EVERY 4 HOURS PRN
Qty: 30 TABLET | Refills: 0 | Status: SHIPPED | OUTPATIENT
Start: 2024-11-27 | End: 2024-12-04

## 2024-11-27 RX ADMIN — HYDROCODONE BITARTRATE AND ACETAMINOPHEN 1 TABLET: 5; 325 TABLET ORAL at 05:11

## 2024-11-27 RX ADMIN — ASPIRIN 325 MG: 325 TABLET, COATED ORAL at 09:11

## 2024-11-27 NOTE — NURSING
Pt aao. Vss. Iv and tele removed. Pt instructed on dc instructions, medications, and follow up appointments, verbalized understanding. Pt sx site dry and intact, no s/s of infection. Pt instructed on s/s of infection and when to notify physician, verbalized understanding. Wife has prescriptions at bedside.

## 2024-11-27 NOTE — PLAN OF CARE
11/27/24 0930   Final Note   Assessment Type Discharge Planning Brief Assessment   Anticipated Discharge Disposition Home   Post-Acute Status   Discharge Delays None known at this time     D/C order noted- went to pt's room.  [employer] member at bedside to provide transportation and assistance at home.  No needs at this time.   show

## 2024-11-29 ENCOUNTER — PATIENT OUTREACH (OUTPATIENT)
Dept: ADMINISTRATIVE | Facility: CLINIC | Age: 69
End: 2024-11-29
Payer: MEDICARE

## 2024-11-29 NOTE — PROGRESS NOTES
C3 nurse spoke with Jd Nelson's wife, Jenn, for a TCC post hospital discharge follow up call. The patient does not have a scheduled HOSFU appointment with Galo Hussein MD within 5-7 days post hospital discharge date 11/27/24. Message sent to PCP staff requesting they contact patient and schedule follow up appointment, and to see if PCP can assist with BP control?     The patient does have lab work scheduled on 12/3/24 and follow up appointment with Karly Hernandez MD ([facility]) on 12/5/24 @ 9:40am. Post-Op appointment with Cherise Greenwood MD (cardiothoracic surgery) on 1/8/25 @ 8:10am, and follow up with Dr. Tripp on 1/8/25 @ 11:20am.     The patient's wife noted that the patient's BP has been elevated before, during and after admission and she cannot get in touch with Cardiology (Dr. Tripp) to get a prescription for BP medications. Requesting Telmisartan as opposed to Lisinopril due to lisinopril causes cough. She noted that the patient's SBP is ranging from 130-200, but the patient is not experiencing any HA, dizziness, blurred vision, weakness. She noted that she has been trying to contact Cardiology, Dr. Tripp, since [place] to have a prescription for BP medications with no response. She noted that she had requested BP medications prior to discharge, but this was not addressed.     Message routed to PCP and Cardiovascular surgery staff for assistance with BP medications for this patient.

## 2024-12-02 ENCOUNTER — TELEPHONE (OUTPATIENT)
Dept: PRIMARY CARE CLINIC | Facility: CLINIC | Age: 69
End: 2024-12-02
Payer: MEDICARE

## 2024-12-02 LAB — PSYCHE PATHOLOGY RESULT: NORMAL

## 2024-12-02 NOTE — ANESTHESIA POSTPROCEDURE EVALUATION
Anesthesia Post Evaluation    Patient: Jd Nelson    Procedure(s) Performed: Procedure(s) (LRB):  ENDARTERECTOMY-CAROTID (Right)    Final Anesthesia Type: general      Patient location during evaluation: PACU  Patient participation: Yes- Able to Participate  Level of consciousness: awake and alert  Post-procedure vital signs: reviewed and stable  Pain management: adequate  Airway patency: patent      Anesthetic complications: no      Cardiovascular status: hemodynamically stable  Respiratory status: unassisted  Hydration status: euvolemic  Follow-up not needed.              Vitals Value Taken Time   /76 11/27/24 0748   Temp 37.3 °C (99.1 °F) 11/27/24 0748   Pulse 85 11/27/24 0748   Resp 16 11/27/24 0539   SpO2 94 % 11/27/24 0748         Event Time   Out of Recovery 14:46:35         Pain/Laurence Score: No data recorded

## 2024-12-02 NOTE — TELEPHONE ENCOUNTER
BP running between 150-200.   Wife stated she is giving pt her BP medication. Which is Telmesartin.  Pt is taking BP TID. Wife requesting we call in BP medication Telmesartin in to pharmacy for pt's BP. No previous BP medication calling in before.  This Morning his BP was 171/91. Pt has appt on Wed at 11 for hospital follow up. Do you want to call in BP medication or have him wait.

## 2024-12-02 NOTE — TELEPHONE ENCOUNTER
----- Message from Jeremy sent at 11/29/2024  1:25 PM CST -----  Who Called: Wife Windy    Caller is requesting assistance/information from provider's office.    Symptoms (please be specific):     How long has patient had these symptoms:     List of preferred pharmacies on file (remove unneeded): [unfilled]  If different, enter pharmacy into here including location and phone number:  [address] [place] [address]      Preferred Method of Contact: Phone Call  Patient's Preferred Phone Number on File: [phone]  Best Call Back Number, if different:  Additional Information:   wife would like to speak with nurse about getting blood pressure med

## 2024-12-03 ENCOUNTER — LAB VISIT (OUTPATIENT)
Dept: LAB | Facility: HOSPITAL | Age: 69
End: 2024-12-03
Attending: INTERNAL MEDICINE
Payer: MEDICARE

## 2024-12-03 DIAGNOSIS — C91.10 CHRONIC LYMPHOCYTIC LEUKEMIA: ICD-10-CM

## 2024-12-03 PROBLEM — Z09 HOSPITAL DISCHARGE FOLLOW-UP: Status: ACTIVE | Noted: 2024-12-03

## 2024-12-03 LAB
ALBUMIN SERPL-MCNC: 4 G/DL (ref 3.4–4.8)
ALBUMIN/GLOB SERPL: 1.2 RATIO (ref 1.1–2)
ALP SERPL-CCNC: 60 UNIT/L (ref 40–150)
ALT SERPL-CCNC: 20 UNIT/L (ref 0–55)
ANION GAP SERPL CALC-SCNC: 8 MEQ/L
AST SERPL-CCNC: 20 UNIT/L (ref 5–34)
BASOPHILS # BLD AUTO: 0.02 X10(3)/MCL
BASOPHILS NFR BLD AUTO: 0.1 %
BILIRUB SERPL-MCNC: 0.6 MG/DL
BUN SERPL-MCNC: 14.4 MG/DL (ref 8.4–25.7)
CALCIUM SERPL-MCNC: 9.7 MG/DL (ref 8.8–10)
CHLORIDE SERPL-SCNC: 104 MMOL/L (ref 98–107)
CO2 SERPL-SCNC: 28 MMOL/L (ref 23–31)
CREAT SERPL-MCNC: 1.12 MG/DL (ref 0.72–1.25)
CREAT/UREA NIT SERPL: 13
EOSINOPHIL # BLD AUTO: 0.09 X10(3)/MCL (ref 0–0.9)
EOSINOPHIL NFR BLD AUTO: 0.6 %
ERYTHROCYTE [DISTWIDTH] IN BLOOD BY AUTOMATED COUNT: 13.2 % (ref 11.5–17)
GFR SERPLBLD CREATININE-BSD FMLA CKD-EPI: >60 ML/MIN/1.73/M2
GLOBULIN SER-MCNC: 3.3 GM/DL (ref 2.4–3.5)
GLUCOSE SERPL-MCNC: 118 MG/DL (ref 82–115)
HCT VFR BLD AUTO: 44 % (ref 42–52)
HGB BLD-MCNC: 14.6 G/DL (ref 14–18)
IMM GRANULOCYTES # BLD AUTO: 0.01 X10(3)/MCL (ref 0–0.04)
IMM GRANULOCYTES NFR BLD AUTO: 0.1 %
LDH SERPL-CCNC: 265 U/L (ref 125–220)
LYMPHOCYTES # BLD AUTO: 12.32 X10(3)/MCL (ref 0.6–4.6)
LYMPHOCYTES NFR BLD AUTO: 77 %
MCH RBC QN AUTO: 30.7 PG (ref 27–31)
MCHC RBC AUTO-ENTMCNC: 33.2 G/DL (ref 33–36)
MCV RBC AUTO: 92.6 FL (ref 80–94)
MONOCYTES # BLD AUTO: 0.18 X10(3)/MCL (ref 0.1–1.3)
MONOCYTES NFR BLD AUTO: 1.1 %
NEUTROPHILS # BLD AUTO: 3.38 X10(3)/MCL (ref 2.1–9.2)
NEUTROPHILS NFR BLD AUTO: 21.1 %
PLATELET # BLD AUTO: 205 X10(3)/MCL (ref 130–400)
PMV BLD AUTO: 9.2 FL (ref 7.4–10.4)
POTASSIUM SERPL-SCNC: 4.3 MMOL/L (ref 3.5–5.1)
PROT SERPL-MCNC: 7.3 GM/DL (ref 5.8–7.6)
RBC # BLD AUTO: 4.75 X10(6)/MCL (ref 4.7–6.1)
SODIUM SERPL-SCNC: 140 MMOL/L (ref 136–145)
WBC # BLD AUTO: 16 X10(3)/MCL (ref 4.5–11.5)

## 2024-12-03 PROCEDURE — 80053 COMPREHEN METABOLIC PANEL: CPT

## 2024-12-03 PROCEDURE — 85025 COMPLETE CBC W/AUTO DIFF WBC: CPT

## 2024-12-03 PROCEDURE — 83615 LACTATE (LD) (LDH) ENZYME: CPT

## 2024-12-03 PROCEDURE — 36415 COLL VENOUS BLD VENIPUNCTURE: CPT

## 2024-12-03 NOTE — PROGRESS NOTES
Subjective:       Patient ID: Jd Nelson is a 69 y.o. male.    Chief Complaint: No chief complaint on file.    Established patient, presenting to the clinic after a recent hospitalization, and for evaluation of what appears to be fairly new onset hypertension.  Notably, he recently had carotid endarterectomy surgery, and was noted coincidentally to have widespread atherosclerotic disease, including a subclavian atherosclerosis causing an artificially low blood pressure in his left arm, something that caused a us to assume that his blood pressure was normal, although his blood pressure in his right arm now is elevated.  He does have an upcoming cardiology appointment, follow-up from the endarterectomy.  He is doing fine after the procedure.  He reports no chest pain, no dyspnea, no edema.    Also, he does appear to have ongoing issues with anxiety, generalized regular anxiety, and is interested on possibly starting a medication.  He has never been on anxiety medication, , reports no feelings of depression, and no suicidal thoughts.      Review of Systems   Constitutional: Negative.  Negative for fatigue and fever.   HENT: Negative.  Negative for sore throat and trouble swallowing.    Eyes: Negative.  Negative for redness and visual disturbance.   Respiratory: Negative.  Negative for chest tightness and shortness of breath.    Cardiovascular: Negative.  Negative for chest pain.   Gastrointestinal: Negative.  Negative for abdominal pain, blood in stool and nausea.   Endocrine: Negative.  Negative for cold intolerance, heat intolerance and polyuria.   Genitourinary: Negative.    Musculoskeletal: Negative.  Negative for arthralgias, gait problem and joint swelling.   Integumentary:  Negative for rash. Negative.   Neurological: Negative.  Negative for dizziness, weakness and headaches.   Psychiatric/Behavioral:  Negative for agitation and dysphoric mood. The patient is nervous/anxious.            Patient Care  "Team:  Galo Hussein MD as PCP - General (Family Medicine)  Joao St MD as Consulting Physician (Urology)  Enoc Gaytan MD as Consulting Physician (Gastroenterology)  Karly Hernandez MD as Consulting Physician (Oncology)  Cherise Greenwood MD as Consulting Physician (Cardiothoracic Surgery)  Objective:   Visit Vitals  BP (!) 170/68   Pulse 100   Resp 18   Ht 5' 6" (1.676 m)   Wt 77.6 kg (171 lb)   SpO2 98%   BMI 27.60 kg/m²        Physical Exam  Constitutional:       Appearance: Normal appearance.   HENT:      Head: Normocephalic.      Mouth/Throat:      Mouth: Mucous membranes are moist.      Pharynx: Oropharynx is clear.   Eyes:      Conjunctiva/sclera: Conjunctivae normal.      Pupils: Pupils are equal, round, and reactive to light.   Cardiovascular:      Rate and Rhythm: Normal rate and regular rhythm.      Heart sounds: Normal heart sounds.   Pulmonary:      Effort: Pulmonary effort is normal.      Breath sounds: Normal breath sounds.   Abdominal:      General: Abdomen is flat.      Palpations: Abdomen is soft.   Musculoskeletal:         General: Normal range of motion.      Cervical back: Neck supple.   Skin:     General: Skin is warm and dry.   Neurological:      General: No focal deficit present.      Mental Status: He is alert and oriented to person, place, and time.   Psychiatric:         Mood and Affect: Mood normal.         Behavior: Behavior normal.         Thought Content: Thought content normal.         Judgment: Judgment normal.           Lab Results   Component Value Date     12/03/2024    K 4.3 12/03/2024     12/03/2024    CO2 28 12/03/2024    BUN 14.4 12/03/2024    CREATININE 1.12 12/03/2024    CALCIUM 9.7 12/03/2024    ALBUMIN 4.0 12/03/2024    BILITOT 0.6 12/03/2024    ALKPHOS 60 12/03/2024    AST 20 12/03/2024    ALT 20 12/03/2024    EGFRNORACEVR >60 12/03/2024     Lab Results   Component Value Date    WBC 16.00 (H) 12/03/2024    RBC 4.75 12/03/2024    HGB " 14.6 12/03/2024    HCT 44.0 12/03/2024    MCV 92.6 12/03/2024    RDW 13.2 12/03/2024     12/03/2024      Lab Results   Component Value Date    CHOL 238 (H) 07/26/2024    TRIG 130 07/26/2024    HDL 51 07/26/2024    .00 (H) 07/26/2024    TOTALCHOLEST 5 07/26/2024     Lab Results   Component Value Date    TSH 1.179 07/26/2024       Assessment:       ICD-10-CM ICD-9-CM   1. Hospital discharge follow-up  Z09 V67.59   2. Primary hypertension  I10 401.9   3. ANJANA (generalized anxiety disorder)  F41.1 300.02   4. Atherosclerosis of subclavian artery  I70.8 440.8       Current Outpatient Medications   Medication Instructions    ascorbic acid (vitamin C) (VITAMIN C) 1,000 mg, Daily    aspirin (ECOTRIN) 325 mg, Oral, Daily    busPIRone (BUSPAR) 5 mg, Oral, 2 times daily    rosuvastatin (CRESTOR) 5 mg, Nightly    TAURINE ORAL 1 tablet, Daily    telmisartan (MICARDIS) 40 mg, Oral, Daily     Plan:     Problem List Items Addressed This Visit          Psychiatric    ANJANA (generalized anxiety disorder) (Chronic)    Overview     Prescribed buspirone 5 mg b.i.d..    Buspirone started on 12/04/2024.         Relevant Medications    busPIRone (BUSPAR) 5 MG Tab       Cardiac/Vascular    Primary hypertension (Chronic)    Overview     Prescribed telmisartan 40 mg daily.    Continue home blood pressure monitoring, we will re-evaluate at next visit.    Recheck in six weeks.         Relevant Medications    telmisartan (MICARDIS) 40 MG Tab    Atherosclerosis of subclavian artery (Chronic)    Overview     Has left-sided subclavian atherosclerosis causing decreased blood pressures in his left arm.  Does see Dr. Taylor, cardiology.            Other    Hospital discharge follow-up - Primary               Follow up in about 6 weeks (around 1/15/2025) for HTN F/up.    This note was created with the assistance of [employer] voice recognition software or phone dictation. There may be transcription errors as a result of using this technology.  Effort has been made to assure accuracy of transcription but any obvious errors or omissions should be clarified with the author of the document.

## 2024-12-03 NOTE — PROGRESS NOTES
Subjective:       Patient ID: Jd Nelson is a 69 y.o. male.    PCP: Dr. Galo Hussein    Chronic Lymphocytic Leukemia Joshua Stage I--Diagnosed 7/31/24    Work-up:  1. Peripheral smear review done 7/26/24--Numerous small to medium size lymphocytes with compact chromatin pattern, mildly irregular nuclei, small amount of azurophilic cytoplasm and occasional nucleoli are present. Innumerable smudge cells are noted. These findings are suspicious for a chronic lymphoproliferative disorder. A flow cytometry analysis of the lymphoid population is recommended if not already performed. The red blood cells, granulocytes, monocytes and platelets are unremarkable.   2. Flow cytometry on peripheral blood done 7/31/24--Chronic lymphocytic leukemia, biclonal (predominately kappa light chain restricted).   3. 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   Imaging:  CT soft tissue neck/C/A/P 8/20/24--no lymphadenopathy in neck, advanced atherosclerotic disease at the bilateral carotid bulbs and proximal internal carotid arteries with high grade stenosis at right proximal ICA and approximately 50% stenosis at left proximal ICA, recommend doppler US, diffuse cervical spine degenerative disease, single enlarged pre-vascular mediastinal LN vs. 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     Current treatment plan: Observation      Chief Complaint: Other Misc (Pt reports no concerns today.)    HPI  Patient here for follow-up of CLL. He underwent CEA last week and is recovering. His labs are good. WBC stable to improved. No other  "concerning symptoms. Plan is for CEA on left and then will discuss addressing the thymic lesion.     Past Medical History:   Diagnosis Date    Bilateral carotid artery stenosis     CLL (chronic lymphocytic leukemia)     Dyslipidemia 04/05/2023    Irritable bowel syndrome 04/05/2023    Mediastinal mass     Primary hypertension 04/05/2023    Retina disorder     "clot"    Subclavian artery stenosis, left       Past Surgical History:   Procedure Laterality Date    CAROTID ENDARTERECTOMY Right 11/26/2024    Procedure: ENDARTERECTOMY-CAROTID;  Surgeon: Cherise Greenwood MD;  Location: [facility];  Service: Cardiology;  Laterality: Right;    COLONOSCOPY       Family History   Problem Relation Name Age of Onset    Asthma Sister      Diabetes Brother       Social History     Socioeconomic History    Marital status:    Tobacco Use    Smoking status: Never    Smokeless tobacco: Never   Substance and Sexual Activity    Alcohol use: Not Currently    Drug use: Never     Social Drivers of Health     Financial Resource Strain: Low Risk  (11/26/2024)    Overall Financial Resource Strain (CARDIA)     Difficulty of Paying Living Expenses: Not hard at all   Food Insecurity: No Food Insecurity (11/26/2024)    Hunger Vital Sign     Worried About Running Out of Food in the Last Year: Never true     Ran Out of Food in the Last Year: Never true   Transportation Needs: No Transportation Needs (11/26/2024)    TRANSPORTATION NEEDS     Transportation : No   Stress: No Stress Concern Present (11/26/2024)    [employer] - Occupational Stress Questionnaire     Feeling of Stress : Not at all   Housing Stability: Low Risk  (11/26/2024)    Housing Stability Vital Sign     Unable to Pay for Housing in the Last Year: No     Homeless in the Last Year: No       Review of patient's allergies indicates:  No Known Allergies   Current Outpatient Medications on File Prior to Visit   Medication Sig Dispense Refill    ascorbic " "acid, vitamin C, (VITAMIN C) 1000 MG tablet Take 1,000 mg by mouth once daily.      aspirin (ECOTRIN) 325 MG EC tablet Take 1 tablet (325 mg total) by mouth once daily. 30 tablet 0    busPIRone (BUSPAR) 5 MG Tab Take 1 tablet (5 mg total) by mouth 2 (two) times daily. 60 tablet 11    rosuvastatin (CRESTOR) 5 MG tablet Take 5 mg by mouth every evening.      TAURINE ORAL Take 1 tablet by mouth once daily.      telmisartan (MICARDIS) 40 MG Tab Take 1 tablet (40 mg total) by mouth once daily. 90 tablet 3     No current facility-administered medications on file prior to visit.      Review of Systems   Constitutional:  Negative for appetite change, fatigue, fever and unexpected weight change.   HENT:  Negative for mouth sores.    Eyes: Negative.    Respiratory:  Negative for cough and shortness of breath.    Cardiovascular:  Negative for chest pain and leg swelling.   Gastrointestinal:  Negative for abdominal distention, abdominal pain, constipation, diarrhea, nausea, vomiting and reflux.   Genitourinary:  Negative for difficulty urinating, dysuria and hematuria.   Musculoskeletal:  Negative for arthralgias and back pain.   Integumentary:  Negative for rash.   Neurological:  Negative for weakness and headaches.   Hematological:  Negative for adenopathy.   Psychiatric/Behavioral:  Negative for sleep disturbance. The patient is not nervous/anxious.             Vitals:    12/05/24 0937   BP: (!) 188/80   Pulse: 109   Resp: 14   Temp: 98.2 °F (36.8 °C)   TempSrc: Oral   SpO2: 95%   Weight: 78.4 kg (172 lb 14.4 oz)   Height: 5' 6" (1.676 m)          Physical Exam  Constitutional:       General: He is awake.      Appearance: Normal appearance.   HENT:      Head: Normocephalic and atraumatic.      Nose: Nose normal.      Mouth/Throat:      Mouth: Mucous membranes are moist.   Eyes:      General: Vision grossly intact.      Extraocular Movements: Extraocular movements intact.      Conjunctiva/sclera: Conjunctivae normal.   Neck: "      Comments: Incision right neck from recent CEA, healing  Cardiovascular:      Rate and Rhythm: Regular rhythm. Tachycardia present.      Heart sounds: Normal heart sounds.   Pulmonary:      Effort: Pulmonary effort is normal.      Breath sounds: Normal breath sounds.   Abdominal:      General: Bowel sounds are normal. There is no distension.      Palpations: Abdomen is soft.      Tenderness: There is no abdominal tenderness.   Musculoskeletal:      Cervical back: Normal range of motion and neck supple.      Right lower leg: No edema.      Left lower leg: No edema.   Lymphadenopathy:      Cervical: No cervical adenopathy.      Upper Body:      Right upper body: No supraclavicular adenopathy.      Left upper body: No supraclavicular adenopathy.   Skin:     General: Skin is warm.   Neurological:      Mental Status: He is alert and oriented to person, place, and time.      Motor: Motor function is intact.   Psychiatric:         Mood and Affect: Mood normal.         Speech: Speech normal.         Behavior: Behavior is cooperative.         Judgment: Judgment normal.         Lab Visit on 12/03/2024   Component Date Value Ref Range Status    Sodium 12/03/2024 140  136 - 145 mmol/L Final    Potassium 12/03/2024 4.3  3.5 - 5.1 mmol/L Final    Chloride 12/03/2024 104  98 - 107 mmol/L Final    CO2 12/03/2024 28  23 - 31 mmol/L Final    Glucose 12/03/2024 118 (H)  82 - 115 mg/dL Final    Blood Urea Nitrogen 12/03/2024 14.4  8.4 - 25.7 mg/dL Final    Creatinine 12/03/2024 1.12  0.72 - 1.25 mg/dL Final    Calcium 12/03/2024 9.7  8.8 - 10.0 mg/dL Final    Protein Total 12/03/2024 7.3  5.8 - 7.6 gm/dL Final    Albumin 12/03/2024 4.0  3.4 - 4.8 g/dL Final    Globulin 12/03/2024 3.3  2.4 - 3.5 gm/dL Final    Albumin/Globulin Ratio 12/03/2024 1.2  1.1 - 2.0 ratio Final    Bilirubin Total 12/03/2024 0.6  <=1.5 mg/dL Final    ALP 12/03/2024 60  40 - 150 unit/L Final    ALT 12/03/2024 20  0 - 55 unit/L Final    AST 12/03/2024 20  5 -  34 unit/L Final    eGFR 12/03/2024 >60  mL/min/1.73/m2 Final    Anion Gap 12/03/2024 8.0  mEq/L Final    BUN/Creatinine Ratio 12/03/2024 13   Final    Lactate Dehydrogenase 12/03/2024 265 (H)  125 - 220 U/L Final    WBC 12/03/2024 16.00 (H)  4.50 - 11.50 x10(3)/mcL Final    RBC 12/03/2024 4.75  4.70 - 6.10 x10(6)/mcL Final    Hgb 12/03/2024 14.6  14.0 - 18.0 g/dL Final    Hct 12/03/2024 44.0  42.0 - 52.0 % Final    MCV 12/03/2024 92.6  80.0 - 94.0 fL Final    MCH 12/03/2024 30.7  27.0 - 31.0 pg Final    MCHC 12/03/2024 33.2  33.0 - 36.0 g/dL Final    RDW 12/03/2024 13.2  11.5 - 17.0 % Final    Platelet 12/03/2024 205  130 - 400 x10(3)/mcL Final    MPV 12/03/2024 9.2  7.4 - 10.4 fL Final    Neut % 12/03/2024 21.1  % Final    Lymph % 12/03/2024 77.0  % Final    Mono % 12/03/2024 1.1  % Final    Eos % 12/03/2024 0.6  % Final    Basophil % 12/03/2024 0.1  % Final    Lymph # 12/03/2024 12.32 (H)  0.6 - 4.6 x10(3)/mcL Final    Neut # 12/03/2024 3.38  2.1 - 9.2 x10(3)/mcL Final    Mono # 12/03/2024 0.18  0.1 - 1.3 x10(3)/mcL Final    Eos # 12/03/2024 0.09  0 - 0.9 x10(3)/mcL Final    Baso # 12/03/2024 0.02  <=0.2 x10(3)/mcL Final    IG# 12/03/2024 0.01  0 - 0.04 x10(3)/mcL Final    IG% 12/03/2024 0.1  % Final   Admission on 11/26/2024, Discharged on 11/27/2024   Component Date Value Ref Range Status    Potassium 11/26/2024 4.1  3.5 - 5.1 mmol/L Final    Pathology Result 11/26/2024    Final             FINAL DIAGNOSIS     PLAQUE, CAROTID, ENDARTERECTOMY:     ATHEROSCLEROTIC PLAQUE.     Electronically Signed by:  Lucretia Washington M.D. , Pathologist  (Case signed 11/29/2024 at 01:06pm)     Source  CAROTID ARTERY     Clinical Information  BILATERAL CAROTID ARTERY STENOSIS     Gross Description  Received in one container of formalin labeled `Jd Nelson carotid` and  accompanied by a requisition labeled `Jd Nelson carotid.`  Received are  multiple pieces of tan plaque material aggregating to 3.2 x 1.4 x 1.3  cm.  Sectioning reveals focal areas of calcification.  Representative sections are  submitted in cassette [id number] following brief decalcification.     X-F-1     SW 11/27/2024     Microscopic Description  1. Microscopic examination performed.  Please see diagnosis.         Lab Visit on 11/25/2024   Component Date Value Ref Range Status    Color, UA 11/25/2024 Colorless  Yellow, Light-Yellow, Colorless, Straw, Dark-Yellow Final    Appearance, UA 11/25/2024 Clear  Clear Final    Specific Gravity, UA 11/25/2024 1.005  1.005 - 1.030 Final    pH, UA 11/25/2024 5.5  5.0 - 8.5 Final    Protein, UA 11/25/2024 Negative  Negative Final    Glucose, UA 11/25/2024 Normal  Negative, Normal Final    Ketones, UA 11/25/2024 Negative  Negative Final    Blood, UA 11/25/2024 Negative  Negative Final    Bilirubin, UA 11/25/2024 Negative  Negative Final    Urobilinogen, UA 11/25/2024 Normal  0.2, 1.0, Normal Final    Nitrites, UA 11/25/2024 Negative  Negative Final    Leukocyte Esterase, UA 11/25/2024 Negative  Negative Final    RBC, UA 11/25/2024 None Seen  None Seen, 0-2, 3-5, 0-5 /HPF Final    WBC, UA 11/25/2024 0-5  None Seen, 0-2, 3-5, 0-5 /HPF Final    Bacteria, UA 11/25/2024 None Seen  None Seen, Trace /HPF Final    Squamous Epithelial Cells, UA 11/25/2024 None Seen  None Seen, Trace, Rare /HPF Final    Sodium 11/25/2024 144  136 - 145 mmol/L Final    Potassium 11/25/2024 5.6 (H)  3.5 - 5.1 mmol/L Final    Chloride 11/25/2024 106  98 - 107 mmol/L Final    CO2 11/25/2024 29  23 - 31 mmol/L Final    Glucose 11/25/2024 114  82 - 115 mg/dL Final    Blood Urea Nitrogen 11/25/2024 13.0  8.4 - 25.7 mg/dL Final    Creatinine 11/25/2024 1.18  0.72 - 1.25 mg/dL Final    Calcium 11/25/2024 10.4 (H)  8.8 - 10.0 mg/dL Final    Protein Total 11/25/2024 7.7 (H)  5.8 - 7.6 gm/dL Final    Albumin 11/25/2024 4.3  3.4 - 4.8 g/dL Final    Globulin 11/25/2024 3.4  2.4 - 3.5 gm/dL Final    Albumin/Globulin Ratio 11/25/2024 1.3  1.1 - 2.0 ratio Final     Bilirubin Total 11/25/2024 0.4  <=1.5 mg/dL Final    ALP 11/25/2024 61  40 - 150 unit/L Final    ALT 11/25/2024 27  0 - 55 unit/L Final    AST 11/25/2024 24  5 - 34 unit/L Final    eGFR 11/25/2024 >60  mL/min/1.73/m2 Final    Anion Gap 11/25/2024 9.0  mEq/L Final    BUN/Creatinine Ratio 11/25/2024 11   Final    Group & Rh 11/25/2024 A POS   Final    Indirect Dedra GEL 11/25/2024 NEG   Final    Specimen Outdate 11/25/2024 12/05/2024 23:59   Corrected    PTT 11/25/2024 25.5  23.2 - 33.7 seconds Final    For Minimal Heparin Infusion, the goal aPTT 64-85 seconds corresponds to an anti-Xa of 0.3-0.5.    For Low Intensity and High Intensity Heparin, the goal aPTT  seconds corresponds to an anti-Xa of 0.3-0.7    WBC 11/25/2024 19.04 (H)  4.50 - 11.50 x10(3)/mcL Final    RBC 11/25/2024 4.90  4.70 - 6.10 x10(6)/mcL Final    Hgb 11/25/2024 15.2  14.0 - 18.0 g/dL Final    Hct 11/25/2024 45.5  42.0 - 52.0 % Final    MCV 11/25/2024 92.9  80.0 - 94.0 fL Final    MCH 11/25/2024 31.0  27.0 - 31.0 pg Final    MCHC 11/25/2024 33.4  33.0 - 36.0 g/dL Final    RDW 11/25/2024 13.9  11.5 - 17.0 % Final    Platelet 11/25/2024 216  130 - 400 x10(3)/mcL Final    MPV 11/25/2024 9.9  7.4 - 10.4 fL Final    NRBC% 11/25/2024 0.0  % Final    ABORH Retype 11/25/2024 A POS   Final    QRS Duration 11/25/2024 92  ms Final    [facility] QTC Calculation 11/25/2024 465  ms Final    WBC 11/25/2024 19.04  x10(3)/mcL Final    Neutrophils % 11/25/2024 32  % Final    Lymphs % 11/25/2024 68  % Final    Monocytes % 11/25/2024 1  % Final    Neutrophils Abs 11/25/2024 6.0928  2.1 - 9.2 x10(3)/mcL Final    Lymphs Abs 11/25/2024 12.9472 (H)  0.6 - 4.6 x10(3)/mcL Final    Monocytes Abs 11/25/2024 0.1904  0.1 - 1.3 x10(3)/mcL Final    Platelets 11/25/2024 Normal  Normal, Adequate Final    RBC Morph 11/25/2024 Normal  Normal Final          Assessment:       1. Chronic lymphocytic leukemia        Plan:       Patient with CLL, Joshua Stage I.  CT soft tissue neck/C/A/P  done 8/20/24 shows only lymphadenopathy in left inguinal region.  Remainder of CBC is normal.  He has no B-symptoms.  FISH with 13q deletion and IgHV hypermutation c/w favorable prognosis.    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 Referred to CV surgery and underwent right CEA. Left CEA planned as well as potential removal of thymic mass.     CBC with stable to improved WBC, normal H/H and platelets. LDH elevated, but just had surgery.     No indications for any treatment at this time.  Recommend continued observation.    F/u in 3 months with labs and visit for observation of CLL.     All questions answered at this time.    Karly Hernandez MD

## 2024-12-03 NOTE — DISCHARGE SUMMARY
[facility]  Cardiothoracic Surgery  Discharge Summary      Patient Name: Jd Nelson  MRN: [id number]  Admission Date: 11/26/2024  Hospital Length of Stay: 1 days  Discharge Date and Time: 11/27/2024 10:04 AM  Attending Physician: No att. providers found   Discharging Provider: GREG Mitchell  Primary Care Provider: Galo Hussein MD    HPI:   No notes on file    Procedure(s) (LRB):  ENDARTERECTOMY-CAROTID (Right)      Indwelling Lines/Drains at time of discharge:   Lines/Drains/Airways       None                 Hospital Course: No notes on file    Goals of Care Treatment Preferences:             Significant Diagnostic Studies: Labs: All labs within the past 24 hours have been reviewed    Pending Diagnostic Studies:       None            No new Assessment & Plan notes have been filed under this hospital service since the last note was generated.  Service: Cardiothoracic Surgery    Final Active Diagnoses:    Diagnosis Date Noted POA    PRINCIPAL PROBLEM:  Dyslipidemia [E78.5] 04/05/2023 Yes     Chronic      Problems Resolved During this Admission:      Discharged Condition: good    Disposition: Home or Self Care    Follow Up:   Follow-up Information       Cherise Greenwood MD Follow up on 1/8/2025.    Specialties: Cardiothoracic Surgery, Cardiology  Why: 8:10AM  Contact information:  [address] [place] [address] Dr  [address] [place] [id number] [phone]                           Patient Instructions:   No discharge procedures on file.  Medications:  Reconciled Home Medications:      Medication List        START taking these medications      HYDROcodone-acetaminophen 5-325 mg per tablet  Commonly known as: NORCO  Take 1 tablet by mouth every 4 (four) hours as needed for Pain.            CHANGE how you take these medications      aspirin 325 MG EC tablet  Commonly known as: ECOTRIN  Take 1 tablet (325 mg total) by mouth once daily.  What changed:   medication strength  how  much to take            CONTINUE taking these medications      rosuvastatin 5 MG tablet  Commonly known as: CRESTOR  Take 5 mg by mouth every evening.     TAURINE ORAL  Take 1 tablet by mouth once daily.     VITAMIN C 1000 MG tablet  Generic drug: ascorbic acid (vitamin C)  Take 1,000 mg by mouth once daily.            Time spent on the discharge of patient: 33 minutes    GREG Mitchell  Cardiothoracic Surgery  [facility] - [facility] Tele[facility]

## 2024-12-04 ENCOUNTER — OFFICE VISIT (OUTPATIENT)
Dept: PRIMARY CARE CLINIC | Facility: CLINIC | Age: 69
End: 2024-12-04
Payer: MEDICARE

## 2024-12-04 VITALS
DIASTOLIC BLOOD PRESSURE: 68 MMHG | BODY MASS INDEX: 27.48 KG/M2 | SYSTOLIC BLOOD PRESSURE: 170 MMHG | HEIGHT: 66 IN | OXYGEN SATURATION: 98 % | HEART RATE: 100 BPM | RESPIRATION RATE: 18 BRPM | WEIGHT: 171 LBS

## 2024-12-04 DIAGNOSIS — Z09 HOSPITAL DISCHARGE FOLLOW-UP: Primary | ICD-10-CM

## 2024-12-04 DIAGNOSIS — F41.1 GAD (GENERALIZED ANXIETY DISORDER): ICD-10-CM

## 2024-12-04 DIAGNOSIS — I10 PRIMARY HYPERTENSION: ICD-10-CM

## 2024-12-04 DIAGNOSIS — I70.8: Chronic | ICD-10-CM

## 2024-12-04 RX ORDER — TELMISARTAN 40 MG/1
40 TABLET ORAL DAILY
Qty: 90 TABLET | Refills: 3 | Status: SHIPPED | OUTPATIENT
Start: 2024-12-04 | End: 2025-12-04

## 2024-12-04 RX ORDER — BUSPIRONE HYDROCHLORIDE 5 MG/1
5 TABLET ORAL 2 TIMES DAILY
Qty: 60 TABLET | Refills: 11 | Status: SHIPPED | OUTPATIENT
Start: 2024-12-04 | End: 2025-12-04

## 2024-12-05 ENCOUNTER — OFFICE VISIT (OUTPATIENT)
Dept: HEMATOLOGY/ONCOLOGY | Facility: CLINIC | Age: 69
End: 2024-12-05
Payer: MEDICARE

## 2024-12-05 VITALS
DIASTOLIC BLOOD PRESSURE: 80 MMHG | HEIGHT: 66 IN | HEART RATE: 109 BPM | BODY MASS INDEX: 27.78 KG/M2 | RESPIRATION RATE: 14 BRPM | SYSTOLIC BLOOD PRESSURE: 188 MMHG | OXYGEN SATURATION: 95 % | WEIGHT: 172.88 LBS | TEMPERATURE: 98 F

## 2024-12-05 DIAGNOSIS — C91.10 CHRONIC LYMPHOCYTIC LEUKEMIA: Primary | Chronic | ICD-10-CM

## 2024-12-05 PROCEDURE — 99214 OFFICE O/P EST MOD 30 MIN: CPT | Mod: S$PBB,,, | Performed by: INTERNAL MEDICINE

## 2024-12-05 PROCEDURE — 99214 OFFICE O/P EST MOD 30 MIN: CPT | Mod: PBBFAC | Performed by: INTERNAL MEDICINE

## 2024-12-05 PROCEDURE — 99999 PR PBB SHADOW E&M-EST. PATIENT-LVL IV: CPT | Mod: PBBFAC,,, | Performed by: INTERNAL MEDICINE

## 2025-01-08 ENCOUNTER — OFFICE VISIT (OUTPATIENT)
Dept: CARDIAC SURGERY | Facility: CLINIC | Age: 70
End: 2025-01-08
Payer: MEDICARE

## 2025-01-08 VITALS
HEART RATE: 91 BPM | SYSTOLIC BLOOD PRESSURE: 188 MMHG | OXYGEN SATURATION: 98 % | WEIGHT: 172.81 LBS | DIASTOLIC BLOOD PRESSURE: 77 MMHG | BODY MASS INDEX: 27.77 KG/M2 | HEIGHT: 66 IN

## 2025-01-08 DIAGNOSIS — I65.23 BILATERAL CAROTID ARTERY STENOSIS: Primary | ICD-10-CM

## 2025-01-08 DIAGNOSIS — J98.59 MEDIASTINAL MASS: Primary | ICD-10-CM

## 2025-01-08 PROCEDURE — 99024 POSTOP FOLLOW-UP VISIT: CPT | Mod: POP,,, | Performed by: THORACIC SURGERY (CARDIOTHORACIC VASCULAR SURGERY)

## 2025-01-08 NOTE — PROGRESS NOTES
"Jd Nelson is a 69 y.o. male patient.   No diagnosis found.  Past Medical History:   Diagnosis Date    Bilateral carotid artery stenosis     CLL (chronic lymphocytic leukemia)     Dyslipidemia 04/05/2023    Irritable bowel syndrome 04/05/2023    Mediastinal mass     Primary hypertension 04/05/2023    Retina disorder     "clot"    Subclavian artery stenosis, left      No past surgical history pertinent negatives on file.  Scheduled Meds:  Continuous Infusions:  PRN Meds:    Review of patient's allergies indicates:  No Known Allergies  There are no hospital problems to display for this patient.    Blood pressure (!) 188/77, pulse 91, height 5' 6" (1.676 m), weight 78.4 kg (172 lb 13.5 oz), SpO2 98%.    Subjective:  The patient returns to the clinic status post right carotid endarterectomy.  He has been doing well with no complaints.      Objective:  His wounds have healed well.  He is neurologically stable.      Assessment & Plan:  The patient will require left carotid endarterectomy.  I also plan to repeat a CTA of the chest in a couple of more months to follow up on his mediastinal mass.      Cherise Greenwood MD  1/8/2025    "

## 2025-01-15 ENCOUNTER — OFFICE VISIT (OUTPATIENT)
Dept: PRIMARY CARE CLINIC | Facility: CLINIC | Age: 70
End: 2025-01-15
Payer: MEDICARE

## 2025-01-15 VITALS
HEART RATE: 99 BPM | OXYGEN SATURATION: 98 % | HEIGHT: 64 IN | RESPIRATION RATE: 18 BRPM | BODY MASS INDEX: 29.88 KG/M2 | DIASTOLIC BLOOD PRESSURE: 81 MMHG | WEIGHT: 175 LBS | SYSTOLIC BLOOD PRESSURE: 134 MMHG

## 2025-01-15 DIAGNOSIS — D72.0 GENETIC ANOMALIES OF LEUKOCYTES: ICD-10-CM

## 2025-01-15 DIAGNOSIS — F41.1 GAD (GENERALIZED ANXIETY DISORDER): Chronic | ICD-10-CM

## 2025-01-15 DIAGNOSIS — I10 PRIMARY HYPERTENSION: Primary | Chronic | ICD-10-CM

## 2025-01-15 DIAGNOSIS — C91.10 CHRONIC LYMPHOCYTIC LEUKEMIA: Chronic | ICD-10-CM

## 2025-01-15 PROCEDURE — 99213 OFFICE O/P EST LOW 20 MIN: CPT | Mod: ,,, | Performed by: GENERAL PRACTICE

## 2025-01-15 RX ORDER — TELMISARTAN 80 MG/1
80 TABLET ORAL DAILY
COMMUNITY
Start: 2025-01-08

## 2025-01-15 NOTE — PROGRESS NOTES
"Subjective:       Patient ID: Jd Nelson is a 69 y.o. male.    Chief Complaint: Hypertension and Follow-up    Established patient, returns for chronic condition follow-up and HTN recheck after starting on telmisartan about six weeks ago for uncontrolled HTN.    Notably, he does bring in blood pressures from home which look great, his blood pressure in the clinic his elevated, no doubt he does have white coat syndrome.  The buspirone is also working fine, taking it once a day almost every day, no issues or problems.    He also suffers from CLL, followed by Dr. Karly Hernandez, hematology Oncology.      Review of Systems   Constitutional: Negative.  Negative for fatigue and fever.   HENT: Negative.  Negative for sore throat and trouble swallowing.    Eyes: Negative.  Negative for redness and visual disturbance.   Respiratory: Negative.  Negative for chest tightness and shortness of breath.    Cardiovascular: Negative.  Negative for chest pain.   Gastrointestinal: Negative.  Negative for abdominal pain, blood in stool and nausea.   Endocrine: Negative.  Negative for cold intolerance, heat intolerance and polyuria.   Genitourinary: Negative.    Musculoskeletal: Negative.  Negative for arthralgias, gait problem and joint swelling.   Integumentary:  Negative for rash. Negative.   Neurological: Negative.  Negative for dizziness, weakness and headaches.   Psychiatric/Behavioral: Negative.  Negative for agitation and dysphoric mood.            Patient Care Team:  Galo Hussein MD as PCP - General (Family Medicine)  Joao St MD as Consulting Physician (Urology)  Enoc Gaytan MD as Consulting Physician (Gastroenterology)  Karly Hernandez MD as Consulting Physician (Oncology)  Cherise Greenwood MD as Consulting Physician (Cardiothoracic Surgery)  Objective:   Visit Vitals  /81   Pulse 99   Resp 18   Ht 5' 4" (1.626 m)   Wt 79.4 kg (175 lb)   SpO2 98%   BMI 30.04 kg/m²        Physical " Exam  Constitutional:       Appearance: Normal appearance.   Eyes:      Conjunctiva/sclera: Conjunctivae normal.   Cardiovascular:      Rate and Rhythm: Normal rate and regular rhythm.   Pulmonary:      Effort: Pulmonary effort is normal.      Breath sounds: Normal breath sounds.   Skin:     General: Skin is warm and dry.   Neurological:      Mental Status: He is alert.   Psychiatric:         Mood and Affect: Mood normal.         Behavior: Behavior normal.           Lab Results   Component Value Date     12/03/2024    K 4.3 12/03/2024     12/03/2024    CO2 28 12/03/2024    BUN 14.4 12/03/2024    CREATININE 1.12 12/03/2024    CALCIUM 9.7 12/03/2024    ALBUMIN 4.0 12/03/2024    BILITOT 0.6 12/03/2024    ALKPHOS 60 12/03/2024    AST 20 12/03/2024    ALT 20 12/03/2024    EGFRNORACEVR >60 12/03/2024     Lab Results   Component Value Date    WBC 16.00 (H) 12/03/2024    RBC 4.75 12/03/2024    HGB 14.6 12/03/2024    HCT 44.0 12/03/2024    MCV 92.6 12/03/2024    RDW 13.2 12/03/2024     12/03/2024      Lab Results   Component Value Date    CHOL 238 (H) 07/26/2024    TRIG 130 07/26/2024    HDL 51 07/26/2024    .00 (H) 07/26/2024    TOTALCHOLEST 5 07/26/2024     Lab Results   Component Value Date    TSH 1.179 07/26/2024     Lab Results   Component Value Date    HGBA1C 5.4 07/26/2024        Lab Results   Component Value Date    PSA 11.55 (H) 04/18/2019         Assessment:       ICD-10-CM ICD-9-CM   1. Primary hypertension  I10 401.9   2. Chronic lymphocytic leukemia  C91.10 204.10   3. 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 ANJANA (generalized anxiety disorder)  F41.1 300.02       Current Outpatient Medications   Medication Instructions    ascorbic acid (vitamin C) (VITAMIN C) 1,000 mg, Daily    aspirin (ECOTRIN) 325 mg, Oral, Daily    aspirin 81 mg, Daily    busPIRone (BUSPAR) 5 mg, Oral, 2 times daily    rosuvastatin (CRESTOR) 5 mg, Nightly    TAURINE ORAL 1 tablet, Daily    telmisartan  (MICARDIS) 80 mg, Daily     Plan:     Problem List Items Addressed This Visit          Psychiatric    ANJANA (generalized anxiety disorder) (Chronic)    Overview     Prescribed buspirone 5 mg daily.    Patient reports stability of moods, and effectiveness of medications, including no agitation, significant somnolence, or significant bouts of anxiety or depression.  Patient is not having any sleep disturbance.  Current treatment plan will continue, with plans to discuss any significant changes in patient's status if necessary if anything changes in the future..    Medication will be continued at current dosage.            Cardiac/Vascular    Primary hypertension - Primary (Chronic)    Overview     Prescribed telmisartan 40 mg daily.    Blood pressures appear to be adequately controlled with current treatment plan, including prescription blood pressure medication.  Medication(s) appear to be effective at current dosages, and are not causing any side effects or problems.  Continue medical management and continue home blood pressure checks.  Average blood pressures at home should be no greater than 130/80.  Patient instructed to contact the clinic if blood pressures become elevated at any point prior to next clinic visit.    Medication will be continued at the current dosage.            Oncology    Chronic lymphocytic leukemia (Chronic)    Overview     Recently diagnosed with CLL, August 2024, seeing Hematology/Oncology, Dr. Karly Hernandez.         Genetic anomalies of leukocytes (Chronic)    Overview     Diagnosed with CLL, August 2024, followed by Dr. Karly Hernandez.                    Follow up for next appointment as scheduled or as needed.    This note was created with the assistance of [employer] voice recognition software or phone dictation. There may be transcription errors as a result of using this technology. Effort has been made to assure accuracy of transcription but any obvious errors or omissions should be clarified  with the author of the document.

## 2025-01-24 ENCOUNTER — HOSPITAL ENCOUNTER (OUTPATIENT)
Dept: RADIOLOGY | Facility: HOSPITAL | Age: 70
Discharge: HOME | End: 2025-01-24
Attending: THORACIC SURGERY (CARDIOTHORACIC VASCULAR SURGERY)
Payer: MEDICARE

## 2025-01-24 DIAGNOSIS — I65.23 BILATERAL CAROTID ARTERY STENOSIS: ICD-10-CM

## 2025-01-24 PROCEDURE — 71046 X-RAY EXAM CHEST 2 VIEWS: CPT | Mod: TC

## 2025-01-30 DIAGNOSIS — I65.23 BILATERAL CAROTID ARTERY STENOSIS: Primary | ICD-10-CM

## 2025-02-12 RX ORDER — NAPROXEN SODIUM 220 MG/1
81 TABLET, FILM COATED ORAL DAILY
Status: ON HOLD | COMMUNITY
End: 2025-02-25 | Stop reason: HOSPADM

## 2025-02-14 ENCOUNTER — ANESTHESIA EVENT (OUTPATIENT)
Dept: SURGERY | Facility: HOSPITAL | Age: 70
DRG: 039 | End: 2025-02-14
Payer: MEDICARE

## 2025-02-17 ENCOUNTER — HOSPITAL ENCOUNTER (OUTPATIENT)
Dept: RADIOLOGY | Facility: HOSPITAL | Age: 70
Discharge: HOME | End: 2025-02-17
Attending: THORACIC SURGERY (CARDIOTHORACIC VASCULAR SURGERY)
Payer: MEDICARE

## 2025-02-17 DIAGNOSIS — I65.23 BILATERAL CAROTID ARTERY STENOSIS: ICD-10-CM

## 2025-02-17 LAB
OHS QRS DURATION: 96 MS
OHS QTC CALCULATION: 425 MS

## 2025-02-17 PROCEDURE — 71046 X-RAY EXAM CHEST 2 VIEWS: CPT | Mod: TC

## 2025-02-24 ENCOUNTER — ANESTHESIA (OUTPATIENT)
Dept: SURGERY | Facility: HOSPITAL | Age: 70
DRG: 039 | End: 2025-02-24
Payer: MEDICARE

## 2025-02-24 ENCOUNTER — HOSPITAL ENCOUNTER (INPATIENT)
Facility: HOSPITAL | Age: 70
LOS: 1 days | Discharge: HOME | DRG: 038 | End: 2025-02-25
Attending: THORACIC SURGERY (CARDIOTHORACIC VASCULAR SURGERY) | Admitting: THORACIC SURGERY (CARDIOTHORACIC VASCULAR SURGERY)
Payer: MEDICARE

## 2025-02-24 DIAGNOSIS — I65.23 BILATERAL CAROTID ARTERY STENOSIS: Primary | ICD-10-CM

## 2025-02-24 PROCEDURE — 63600175 PHARM REV CODE 636 W HCPCS: Performed by: STUDENT IN AN ORGANIZED HEALTH CARE EDUCATION/TRAINING PROGRAM

## 2025-02-24 PROCEDURE — D9220A PRA ANESTHESIA: Mod: CRNA,,, | Performed by: STUDENT IN AN ORGANIZED HEALTH CARE EDUCATION/TRAINING PROGRAM

## 2025-02-24 PROCEDURE — 35301 RECHANNELING OF ARTERY: CPT | Mod: AS,79,LT, | Performed by: PHYSICIAN ASSISTANT

## 2025-02-24 PROCEDURE — 37000008 HC ANESTHESIA 1ST 15 MINUTES: Performed by: THORACIC SURGERY (CARDIOTHORACIC VASCULAR SURGERY)

## 2025-02-24 PROCEDURE — 37000009 HC ANESTHESIA EA ADD 15 MINS: Performed by: THORACIC SURGERY (CARDIOTHORACIC VASCULAR SURGERY)

## 2025-02-24 PROCEDURE — 36000706: Performed by: THORACIC SURGERY (CARDIOTHORACIC VASCULAR SURGERY)

## 2025-02-24 PROCEDURE — 63600175 PHARM REV CODE 636 W HCPCS: Performed by: THORACIC SURGERY (CARDIOTHORACIC VASCULAR SURGERY)

## 2025-02-24 PROCEDURE — 25000003 PHARM REV CODE 250: Performed by: STUDENT IN AN ORGANIZED HEALTH CARE EDUCATION/TRAINING PROGRAM

## 2025-02-24 PROCEDURE — C1768 GRAFT, VASCULAR: HCPCS | Performed by: THORACIC SURGERY (CARDIOTHORACIC VASCULAR SURGERY)

## 2025-02-24 PROCEDURE — 11000001 HC ACUTE MED/SURG PRIVATE ROOM

## 2025-02-24 PROCEDURE — 71000039 HC RECOVERY, EACH ADD'L HOUR: Performed by: THORACIC SURGERY (CARDIOTHORACIC VASCULAR SURGERY)

## 2025-02-24 PROCEDURE — 88304 TISSUE EXAM BY PATHOLOGIST: CPT | Performed by: THORACIC SURGERY (CARDIOTHORACIC VASCULAR SURGERY)

## 2025-02-24 PROCEDURE — 71000033 HC RECOVERY, INTIAL HOUR: Performed by: THORACIC SURGERY (CARDIOTHORACIC VASCULAR SURGERY)

## 2025-02-24 PROCEDURE — 35301 RECHANNELING OF ARTERY: CPT | Mod: 79,LT,, | Performed by: THORACIC SURGERY (CARDIOTHORACIC VASCULAR SURGERY)

## 2025-02-24 PROCEDURE — 63600175 PHARM REV CODE 636 W HCPCS: Performed by: PHYSICIAN ASSISTANT

## 2025-02-24 PROCEDURE — 36000707: Performed by: THORACIC SURGERY (CARDIOTHORACIC VASCULAR SURGERY)

## 2025-02-24 PROCEDURE — 03CL0ZZ EXTIRPATION OF MATTER FROM LEFT INTERNAL CAROTID ARTERY, OPEN APPROACH: ICD-10-PCS | Performed by: THORACIC SURGERY (CARDIOTHORACIC VASCULAR SURGERY)

## 2025-02-24 PROCEDURE — 27201423 OPTIME MED/SURG SUP & DEVICES STERILE SUPPLY: Performed by: THORACIC SURGERY (CARDIOTHORACIC VASCULAR SURGERY)

## 2025-02-24 PROCEDURE — 25000003 PHARM REV CODE 250: Performed by: PHYSICIAN ASSISTANT

## 2025-02-24 PROCEDURE — 03UL0KZ SUPPLEMENT LEFT INTERNAL CAROTID ARTERY WITH NONAUTOLOGOUS TISSUE SUBSTITUTE, OPEN APPROACH: ICD-10-PCS | Performed by: THORACIC SURGERY (CARDIOTHORACIC VASCULAR SURGERY)

## 2025-02-24 PROCEDURE — D9220A PRA ANESTHESIA: Mod: ANES,,, | Performed by: ANESTHESIOLOGY

## 2025-02-24 PROCEDURE — 88311 DECALCIFY TISSUE: CPT

## 2025-02-24 PROCEDURE — 63600175 PHARM REV CODE 636 W HCPCS

## 2025-02-24 DEVICE — PATCH XENOSURE TAPR .8X8CM: Type: IMPLANTABLE DEVICE | Site: CAROTID | Status: FUNCTIONAL

## 2025-02-24 RX ORDER — DEXMEDETOMIDINE HYDROCHLORIDE 100 UG/ML
INJECTION, SOLUTION INTRAVENOUS
Status: DISCONTINUED | OUTPATIENT
Start: 2025-02-24 | End: 2025-02-24

## 2025-02-24 RX ORDER — MIDAZOLAM HYDROCHLORIDE 1 MG/ML
INJECTION INTRAMUSCULAR; INTRAVENOUS
Status: DISCONTINUED | OUTPATIENT
Start: 2025-02-24 | End: 2025-02-24

## 2025-02-24 RX ORDER — EPHEDRINE SULFATE 50 MG/ML
INJECTION, SOLUTION INTRAVENOUS
Status: DISCONTINUED | OUTPATIENT
Start: 2025-02-24 | End: 2025-02-24

## 2025-02-24 RX ORDER — LOPERAMIDE HYDROCHLORIDE 2 MG/1
4 CAPSULE ORAL ONCE
Status: DISCONTINUED | OUTPATIENT
Start: 2025-02-24 | End: 2025-02-25 | Stop reason: HOSPADM

## 2025-02-24 RX ORDER — HEPARIN SOD,PORCINE/0.9 % NACL 1000/500ML
INTRAVENOUS SOLUTION INTRAVENOUS
Status: DISCONTINUED | OUTPATIENT
Start: 2025-02-24 | End: 2025-02-24 | Stop reason: HOSPADM

## 2025-02-24 RX ORDER — ONDANSETRON HYDROCHLORIDE 2 MG/ML
INJECTION, SOLUTION INTRAMUSCULAR; INTRAVENOUS
Status: DISCONTINUED | OUTPATIENT
Start: 2025-02-24 | End: 2025-02-24

## 2025-02-24 RX ORDER — HYDRALAZINE HYDROCHLORIDE 20 MG/ML
5 INJECTION INTRAMUSCULAR; INTRAVENOUS
Status: DISCONTINUED | OUTPATIENT
Start: 2025-02-24 | End: 2025-02-25 | Stop reason: HOSPADM

## 2025-02-24 RX ORDER — PROPOFOL 10 MG/ML
VIAL (ML) INTRAVENOUS
Status: DISCONTINUED | OUTPATIENT
Start: 2025-02-24 | End: 2025-02-24

## 2025-02-24 RX ORDER — HYDROMORPHONE HYDROCHLORIDE 2 MG/ML
INJECTION, SOLUTION INTRAMUSCULAR; INTRAVENOUS; SUBCUTANEOUS
Status: DISCONTINUED | OUTPATIENT
Start: 2025-02-24 | End: 2025-02-24

## 2025-02-24 RX ORDER — ONDANSETRON HYDROCHLORIDE 2 MG/ML
4 INJECTION, SOLUTION INTRAVENOUS EVERY 12 HOURS PRN
Status: DISCONTINUED | OUTPATIENT
Start: 2025-02-24 | End: 2025-02-25 | Stop reason: HOSPADM

## 2025-02-24 RX ORDER — DEXAMETHASONE SODIUM PHOSPHATE 4 MG/ML
INJECTION, SOLUTION INTRA-ARTICULAR; INTRALESIONAL; INTRAMUSCULAR; INTRAVENOUS; SOFT TISSUE
Status: DISCONTINUED | OUTPATIENT
Start: 2025-02-24 | End: 2025-02-24

## 2025-02-24 RX ORDER — PROCHLORPERAZINE EDISYLATE 5 MG/ML
5 INJECTION INTRAMUSCULAR; INTRAVENOUS EVERY 6 HOURS PRN
Status: DISCONTINUED | OUTPATIENT
Start: 2025-02-24 | End: 2025-02-25 | Stop reason: HOSPADM

## 2025-02-24 RX ORDER — LIDOCAINE HYDROCHLORIDE 10 MG/ML
1 INJECTION, SOLUTION EPIDURAL; INFILTRATION; INTRACAUDAL; PERINEURAL ONCE
OUTPATIENT
Start: 2025-02-24 | End: 2025-02-24

## 2025-02-24 RX ORDER — HYDROCODONE BITARTRATE AND ACETAMINOPHEN 5; 325 MG/1; MG/1
1 TABLET ORAL
Status: DISCONTINUED | OUTPATIENT
Start: 2025-02-24 | End: 2025-02-24 | Stop reason: HOSPADM

## 2025-02-24 RX ORDER — ASPIRIN 81 MG/1
81 TABLET ORAL DAILY
Status: DISCONTINUED | OUTPATIENT
Start: 2025-02-24 | End: 2025-02-25 | Stop reason: HOSPADM

## 2025-02-24 RX ORDER — PHENYLEPHRINE HCL IN 0.9% NACL 1 MG/10 ML
SYRINGE (ML) INTRAVENOUS
Status: DISCONTINUED | OUTPATIENT
Start: 2025-02-24 | End: 2025-02-24

## 2025-02-24 RX ORDER — FAMOTIDINE 10 MG/ML
20 INJECTION INTRAVENOUS ONCE
OUTPATIENT
Start: 2025-02-24

## 2025-02-24 RX ORDER — HYDROMORPHONE HYDROCHLORIDE 2 MG/ML
0.2 INJECTION, SOLUTION INTRAMUSCULAR; INTRAVENOUS; SUBCUTANEOUS EVERY 5 MIN PRN
Status: DISCONTINUED | OUTPATIENT
Start: 2025-02-24 | End: 2025-02-24 | Stop reason: HOSPADM

## 2025-02-24 RX ORDER — ROCURONIUM BROMIDE 10 MG/ML
INJECTION, SOLUTION INTRAVENOUS
Status: DISCONTINUED | OUTPATIENT
Start: 2025-02-24 | End: 2025-02-24

## 2025-02-24 RX ORDER — PROTAMINE SULFATE 10 MG/ML
INJECTION, SOLUTION INTRAVENOUS
Status: DISCONTINUED | OUTPATIENT
Start: 2025-02-24 | End: 2025-02-24

## 2025-02-24 RX ORDER — MIDAZOLAM HYDROCHLORIDE 2 MG/2ML
2 INJECTION, SOLUTION INTRAMUSCULAR; INTRAVENOUS ONCE
OUTPATIENT
Start: 2025-02-24

## 2025-02-24 RX ORDER — FENTANYL CITRATE 50 UG/ML
INJECTION, SOLUTION INTRAMUSCULAR; INTRAVENOUS
Status: DISCONTINUED | OUTPATIENT
Start: 2025-02-24 | End: 2025-02-24

## 2025-02-24 RX ORDER — HYDROCODONE BITARTRATE AND ACETAMINOPHEN 5; 325 MG/1; MG/1
1 TABLET ORAL EVERY 4 HOURS PRN
Status: DISCONTINUED | OUTPATIENT
Start: 2025-02-24 | End: 2025-02-25 | Stop reason: HOSPADM

## 2025-02-24 RX ORDER — MUPIROCIN 20 MG/G
OINTMENT TOPICAL 2 TIMES DAILY
Status: DISCONTINUED | OUTPATIENT
Start: 2025-02-24 | End: 2025-02-25 | Stop reason: HOSPADM

## 2025-02-24 RX ORDER — SODIUM CHLORIDE 0.9 % (FLUSH) 0.9 %
10 SYRINGE (ML) INJECTION
Status: DISCONTINUED | OUTPATIENT
Start: 2025-02-24 | End: 2025-02-24 | Stop reason: HOSPADM

## 2025-02-24 RX ORDER — FENTANYL CITRATE 50 UG/ML
25 INJECTION, SOLUTION INTRAMUSCULAR; INTRAVENOUS EVERY 5 MIN PRN
Status: DISCONTINUED | OUTPATIENT
Start: 2025-02-24 | End: 2025-02-24 | Stop reason: HOSPADM

## 2025-02-24 RX ORDER — HEPARIN SODIUM 1000 [USP'U]/ML
INJECTION, SOLUTION INTRAVENOUS; SUBCUTANEOUS
Status: DISCONTINUED | OUTPATIENT
Start: 2025-02-24 | End: 2025-02-24

## 2025-02-24 RX ORDER — GLUCAGON 1 MG
1 KIT INJECTION
Status: DISCONTINUED | OUTPATIENT
Start: 2025-02-24 | End: 2025-02-24 | Stop reason: HOSPADM

## 2025-02-24 RX ORDER — DOCUSATE SODIUM 100 MG/1
100 CAPSULE, LIQUID FILLED ORAL 2 TIMES DAILY
Status: DISCONTINUED | OUTPATIENT
Start: 2025-02-24 | End: 2025-02-25 | Stop reason: HOSPADM

## 2025-02-24 RX ORDER — LIDOCAINE HYDROCHLORIDE 20 MG/ML
INJECTION, SOLUTION EPIDURAL; INFILTRATION; INTRACAUDAL; PERINEURAL
Status: DISCONTINUED | OUTPATIENT
Start: 2025-02-24 | End: 2025-02-24

## 2025-02-24 RX ORDER — HYDRALAZINE HYDROCHLORIDE 20 MG/ML
INJECTION INTRAMUSCULAR; INTRAVENOUS
Status: COMPLETED
Start: 2025-02-24 | End: 2025-02-24

## 2025-02-24 RX ORDER — CEFUROXIME SODIUM 1.5 G/16ML
1.5 INJECTION, POWDER, FOR SOLUTION INTRAVENOUS
Status: COMPLETED | OUTPATIENT
Start: 2025-02-24 | End: 2025-02-24

## 2025-02-24 RX ADMIN — EPHEDRINE SULFATE 15 MG: 50 INJECTION INTRAVENOUS at 12:02

## 2025-02-24 RX ADMIN — PROPOFOL 150 MG: 10 INJECTION, EMULSION INTRAVENOUS at 10:02

## 2025-02-24 RX ADMIN — MUPIROCIN: 20 OINTMENT TOPICAL at 08:02

## 2025-02-24 RX ADMIN — Medication 200 MCG: at 12:02

## 2025-02-24 RX ADMIN — ROCURONIUM BROMIDE 50 MG: 10 SOLUTION INTRAVENOUS at 10:02

## 2025-02-24 RX ADMIN — DEXAMETHASONE SODIUM PHOSPHATE 4 MG: 4 INJECTION, SOLUTION INTRA-ARTICULAR; INTRALESIONAL; INTRAMUSCULAR; INTRAVENOUS; SOFT TISSUE at 11:02

## 2025-02-24 RX ADMIN — LIDOCAINE HYDROCHLORIDE 80 MG: 20 INJECTION, SOLUTION INTRAVENOUS at 10:02

## 2025-02-24 RX ADMIN — ASPIRIN 81 MG: 81 TABLET, COATED ORAL at 04:02

## 2025-02-24 RX ADMIN — EPHEDRINE SULFATE 10 MG: 50 INJECTION INTRAVENOUS at 11:02

## 2025-02-24 RX ADMIN — FENTANYL CITRATE 50 MCG: 50 INJECTION, SOLUTION INTRAMUSCULAR; INTRAVENOUS at 12:02

## 2025-02-24 RX ADMIN — ROCURONIUM BROMIDE 20 MG: 10 SOLUTION INTRAVENOUS at 12:02

## 2025-02-24 RX ADMIN — SODIUM CHLORIDE, SODIUM GLUCONATE, SODIUM ACETATE, POTASSIUM CHLORIDE AND MAGNESIUM CHLORIDE: 526; 502; 368; 37; 30 INJECTION, SOLUTION INTRAVENOUS at 12:02

## 2025-02-24 RX ADMIN — HYDROMORPHONE HYDROCHLORIDE 1 MG: 2 INJECTION, SOLUTION INTRAMUSCULAR; INTRAVENOUS; SUBCUTANEOUS at 01:02

## 2025-02-24 RX ADMIN — EPHEDRINE SULFATE 10 MG: 50 INJECTION INTRAVENOUS at 12:02

## 2025-02-24 RX ADMIN — HYDRALAZINE HYDROCHLORIDE 5 MG: 20 INJECTION INTRAMUSCULAR; INTRAVENOUS at 02:02

## 2025-02-24 RX ADMIN — DOCUSATE SODIUM 100 MG: 100 CAPSULE, LIQUID FILLED ORAL at 08:02

## 2025-02-24 RX ADMIN — HYDRALAZINE HYDROCHLORIDE 5 MG: 20 INJECTION INTRAMUSCULAR; INTRAVENOUS at 01:02

## 2025-02-24 RX ADMIN — MIDAZOLAM HYDROCHLORIDE 2 MG: 1 INJECTION, SOLUTION INTRAMUSCULAR; INTRAVENOUS at 10:02

## 2025-02-24 RX ADMIN — PHENYLEPHRINE HYDROCHLORIDE 25 MCG/MIN: 10 INJECTION INTRAVENOUS at 11:02

## 2025-02-24 RX ADMIN — HYDROCODONE BITARTRATE AND ACETAMINOPHEN 1 TABLET: 5; 325 TABLET ORAL at 08:02

## 2025-02-24 RX ADMIN — PROPOFOL 50 MG: 10 INJECTION, EMULSION INTRAVENOUS at 12:02

## 2025-02-24 RX ADMIN — HYDROCODONE BITARTRATE AND ACETAMINOPHEN 1 TABLET: 5; 325 TABLET ORAL at 04:02

## 2025-02-24 RX ADMIN — HEPARIN SODIUM 10000 UNITS: 1000 INJECTION, SOLUTION INTRAVENOUS; SUBCUTANEOUS at 12:02

## 2025-02-24 RX ADMIN — SUGAMMADEX 200 MG: 100 INJECTION, SOLUTION INTRAVENOUS at 12:02

## 2025-02-24 RX ADMIN — FENTANYL CITRATE 50 MCG: 50 INJECTION, SOLUTION INTRAMUSCULAR; INTRAVENOUS at 10:02

## 2025-02-24 RX ADMIN — DEXMEDETOMIDINE 5 MCG: 200 INJECTION, SOLUTION INTRAVENOUS at 12:02

## 2025-02-24 RX ADMIN — CEFUROXIME 1.5 G: 1.5 INJECTION, POWDER, FOR SOLUTION INTRAVENOUS at 11:02

## 2025-02-24 RX ADMIN — DEXMEDETOMIDINE 10 MCG: 200 INJECTION, SOLUTION INTRAVENOUS at 01:02

## 2025-02-24 RX ADMIN — DEXMEDETOMIDINE 5 MCG: 200 INJECTION, SOLUTION INTRAVENOUS at 01:02

## 2025-02-24 RX ADMIN — ONDANSETRON 4 MG: 2 INJECTION INTRAMUSCULAR; INTRAVENOUS at 12:02

## 2025-02-24 RX ADMIN — PROTAMINE SULFATE 5 MG: 10 INJECTION, SOLUTION INTRAVENOUS at 12:02

## 2025-02-24 RX ADMIN — PROTAMINE SULFATE 95 MG: 10 INJECTION, SOLUTION INTRAVENOUS at 12:02

## 2025-02-24 RX ADMIN — SODIUM CHLORIDE, SODIUM GLUCONATE, SODIUM ACETATE, POTASSIUM CHLORIDE AND MAGNESIUM CHLORIDE: 526; 502; 368; 37; 30 INJECTION, SOLUTION INTRAVENOUS at 10:02

## 2025-02-24 NOTE — H&P
"History & Physical     SUBJECTIVE:      History of Present Illness:  The patient is presenting for evaluation for bilateral carotid artery stenosis anterior mediastinal mass and peripheral vascular disease.  He has no symptoms of dizziness and no localizing symptoms.  CTA of neck and chest were ordered.    He is s/p right CEA 11/26/2024. He is now a candidate for left CEA.          Chief Complaint   Patient presents with    Pre-op Exam       REFERRAL-DR. RODOLFO NAVARRO. CEA. DX: BILAT CAROTID STENOSIS, HIGH GRADE RT ICA, APPROX 50% LT ICA, ENLARGED ANT MEDIASTINAL LYMPH NOTE VS SMALL MASS THYMOMA. PMH: CLL, PAD, DIVERTICULOSIS, HTN, IBS, DYSLIPIDEMIA.         Review of patient's allergies indicates:  No Known Allergies     Current Medications   No current outpatient medications on file.      No current facility-administered medications for this visit.                 Past Medical History:   Diagnosis Date    Dyslipidemia 4/5/2023    Irritable bowel syndrome 4/5/2023    Primary hypertension 4/5/2023      History reviewed. No pertinent surgical history.         Family History   Problem Relation Name Age of Onset    Asthma Sister        Diabetes Brother          Social History   Social History           Tobacco Use    Smoking status: Never    Smokeless tobacco: Never   Substance Use Topics    Alcohol use: Yes    Drug use: Never            Review of Systems:  Review of Systems   Constitutional: Negative.    HENT: Negative.     Eyes: Negative.    Respiratory: Negative.     Cardiovascular: Negative.    Gastrointestinal: Negative.    Endocrine: Negative.    Genitourinary: Negative.    Musculoskeletal: Negative.         Claudications   Skin: Negative.    Allergic/Immunologic: Negative.    Neurological: Negative.    Hematological: Negative.    Psychiatric/Behavioral: Negative.           OBJECTIVE:      Vital Signs (Most Recent)  Pulse: 96 (09/25/24 0950)  BP: 124/86 (09/25/24 0950)  SpO2: 96 % (09/25/24 0950)  5' 5" (1.651 " m)  79.8 kg (176 lb)      Physical Exam:  Physical Exam  Vitals reviewed.   Constitutional:       Appearance: Normal appearance.   HENT:      Head: Normocephalic and atraumatic.      Nose: Nose normal.      Mouth/Throat:      Mouth: Mucous membranes are dry.      Pharynx: Oropharynx is clear.   Eyes:      Extraocular Movements: Extraocular movements intact.      Conjunctiva/sclera: Conjunctivae normal.      Pupils: Pupils are equal, round, and reactive to light.   Cardiovascular:      Rate and Rhythm: Normal rate and regular rhythm.      Pulses: Normal pulses.   Pulmonary:      Effort: Pulmonary effort is normal.      Breath sounds: Normal breath sounds.   Abdominal:      General: Abdomen is flat.      Palpations: Abdomen is soft.   Musculoskeletal:         General: Normal range of motion.      Cervical back: Neck supple.   Skin:     General: Skin is warm and dry.   Neurological:      General: No focal deficit present.   Psychiatric:         Mood and Affect: Mood normal.            Laboratory:  None        Diagnostic Results:  CTA of the chest reviewed CTA of the neck soft tissue reviewed ultrasound of the carotid reviewed        ASSESSMENT/PLAN:      Significant left carotid artery stenosis    Planning left CEA  Dr. Greenwood discussed risks and benefits of surgery with patient prior to scheduling procedure.

## 2025-02-24 NOTE — ANESTHESIA POSTPROCEDURE EVALUATION
Anesthesia Post Evaluation    Patient: Jd Nelson    Procedure(s) Performed: Procedure(s) (LRB):  ENDARTERECTOMY-CAROTID (Left)    Final Anesthesia Type: general      Patient location during evaluation: PACU  Patient participation: Yes- Able to Participate  Level of consciousness: awake and alert  Post-procedure vital signs: reviewed and stable  Pain management: adequate  Airway patency: patent    PONV status at discharge: No PONV  Anesthetic complications: no      Cardiovascular status: blood pressure returned to baseline  Respiratory status: unassisted and spontaneous ventilation  Hydration status: euvolemic  Follow-up needed               Vitals Value Taken Time   /63 02/24/25 15:55   Temp 36.7 °C (98.1 °F) 02/24/25 14:51   Pulse 89 02/24/25 15:55   Resp 12 02/24/25 14:33   SpO2 91 % 02/24/25 15:55   Vitals shown include unfiled device data.      Event Time   Out of Recovery 14:40:00         Pain/Laurence Score: Laurence Score: 10 (2/24/2025  2:40 PM)

## 2025-02-24 NOTE — TRANSFER OF CARE
"Anesthesia Transfer of Care Note    Patient: Jd Nelson    Procedure(s) Performed: Procedure(s) (LRB):  ENDARTERECTOMY-CAROTID (Left)    Patient location: PACU    Anesthesia Type: general    Transport from OR: Transported from OR on room air with adequate spontaneous ventilation    Post pain: adequate analgesia    Post assessment: no apparent anesthetic complications    Post vital signs: stable    Level of consciousness: awake and alert    Nausea/Vomiting: no nausea/vomiting    Complications: none    Transfer of care protocol was followedComments: Following commands. AAOx3 and moving all extremities in PACU      Last vitals: Visit Vitals  BP (!) 178/91 (BP Location: Right arm, Patient Position: Lying)   Pulse 89   Temp 36.8 °C (98.3 °F) (Oral)   Resp 18   Ht 5' 6" (1.676 m)   Wt 77.5 kg (170 lb 13.7 oz)   SpO2 97%   BMI 27.58 kg/m²     "

## 2025-02-24 NOTE — ANESTHESIA PREPROCEDURE EVALUATION
02/24/2025  Jd Nelson is a 69 y.o., male.      Pre-op Assessment    I have reviewed the Patient Summary Reports.     I have reviewed the Nursing Notes. I have reviewed the NPO Status.   I have reviewed the Medications.     Review of Systems  Anesthesia Hx:  No problems with previous Anesthesia                Social:  Non-Smoker       Hematology/Oncology:                   Hematology Comments: CLL leukemia                    Cardiovascular:     Hypertension   Denies MI.        Denies Angina.  Denies CHF.    hyperlipidemia    Hx mediastinal mass    Hx carotid stenosis Rt                     Hypertension         Pulmonary:    Denies COPD.  Denies Asthma.                    Renal/:   Denies Chronic Renal Disease. no renal calculi BPH              Hepatic/GI:      Denies GERD. Denies Liver Disease.  Denies Hepatitis.              Neurological:    Denies CVA.    Denies Seizures.                                Endocrine:  Denies Diabetes. Denies Hypothyroidism.  Denies Hyperthyroidism.       Denies Obesity / BMI > 30  Psych:  Psychiatric History anxiety                 Physical Exam  General: Well nourished, Cooperative, Alert and Oriented    Airway:  Mallampati: I   Mouth Opening: Normal  TM Distance: Normal  Tongue: Normal  Neck ROM: Normal ROM    Dental:  Intact        Anesthesia Plan  Type of Anesthesia, risks & benefits discussed:    Anesthesia Type: Gen ETT  Intra-op Monitoring Plan: Standard ASA Monitors and Art Line  Induction:  IV  Airway Plan: Video  Informed Consent: Informed consent signed with the Patient and all parties understand the risks and agree with anesthesia plan.  All questions answered. Patient consented to blood products? Yes  ASA Score: 3  Day of Surgery Review of History & Physical: H&P Update referred to the surgeon/provider.    Ready For Surgery From Anesthesia Perspective.      .

## 2025-02-24 NOTE — ANESTHESIA PROCEDURE NOTES
Intubation    Date/Time: 2/24/2025 11:03 AM    Performed by: Lexii Keith CRNA  Authorized by: Theo Morse DO    Intubation:     Induction:  Intravenous    Intubated:  Postinduction    Mask Ventilation:  Easy mask    Attempts:  1    Attempted By:  CRNA    Method of Intubation:  Direct    Blade:  Souza 2    Laryngeal View Grade: Grade I - full view of cords      Difficult Airway Encountered?: No      Complications:  None    Airway Device:  Oral endotracheal tube    Airway Device Size:  7.5    Style/Cuff Inflation:  Cuffed    Inflation Amount (mL):  6    Tube secured:  21    Secured at:  The lips    Placement Verified By:  Capnometry    Complicating Factors:  None    Findings Post-Intubation:  BS equal bilateral

## 2025-02-24 NOTE — OP NOTE
Preop diagnosis:  Severe Lt carotid stenosis    Procedure:  Left carotid endarterectomy with pericardial patch angioplasty    Postop diagnosis:  The same    Surgeon:Cherise Greenwood MD    Assistant:  GREG Portillo    Date:  02/24/2025  Blood loss:  Minimal    Anaesthesia:  General    Under informed consent the patient was taken to the OR put in supine position general anesthesia was induced and therefore maintained for the remainder of the procedure.  The skin of the left neck was prepped and draped in the usual sterile fashion IV antibiotics administered anesthesia placed appropriate lines.    A small incision was made anterior to sternocleidomastoid muscle taking down subcutaneous tissue and platysma exposing the carotid sheath.  The facial vein was cut between ligatures.  I had good exposure of the common internal and external carotid artery.  The patient was heparinized.  3 minute after heparin clamps were applied on the internal common and external carotid artery.  The cerebral oxygen monitor was stable.  Arteriotomy was performed and extended in the direction of the internal carotid artery.  There was excellent backflow from it.  Endarterectomy was performed with eversion endarterectomy of the external carotid artery and very smooth transition plane distally on the internal carotid artery.  The defect was cleaned very well and irrigated with heparinized saline.  It was closed over pericardial patch with de-airing from the internal carotid artery prior to tying the knots.  The flow was then reestablished in the direction of the external 1st then internal carotid artery.  Heparin was reversed with protamine.  There was good with hemostasis.  The wounds were irrigated and closed in layers absorbable sutures.    The patient was transferred to the recovery room in a stable situation.

## 2025-02-25 VITALS
DIASTOLIC BLOOD PRESSURE: 78 MMHG | SYSTOLIC BLOOD PRESSURE: 124 MMHG | HEART RATE: 101 BPM | OXYGEN SATURATION: 95 % | WEIGHT: 170.88 LBS | RESPIRATION RATE: 20 BRPM | BODY MASS INDEX: 27.46 KG/M2 | HEIGHT: 66 IN | TEMPERATURE: 99 F

## 2025-02-25 DIAGNOSIS — G89.18 POST-OP PAIN: Primary | ICD-10-CM

## 2025-02-25 DIAGNOSIS — I65.23 BILATERAL CAROTID ARTERY STENOSIS: ICD-10-CM

## 2025-02-25 DIAGNOSIS — I65.23 BILATERAL CAROTID ARTERY STENOSIS: Primary | ICD-10-CM

## 2025-02-25 LAB
ANION GAP SERPL CALC-SCNC: 10 MEQ/L
BUN SERPL-MCNC: 16.1 MG/DL (ref 8.4–25.7)
CALCIUM SERPL-MCNC: 8.5 MG/DL (ref 8.8–10)
CHLORIDE SERPL-SCNC: 107 MMOL/L (ref 98–107)
CO2 SERPL-SCNC: 24 MMOL/L (ref 23–31)
CREAT SERPL-MCNC: 0.96 MG/DL (ref 0.72–1.25)
CREAT/UREA NIT SERPL: 17
GFR SERPLBLD CREATININE-BSD FMLA CKD-EPI: >60 ML/MIN/1.73/M2
GLUCOSE SERPL-MCNC: 99 MG/DL (ref 82–115)
POTASSIUM SERPL-SCNC: 3.9 MMOL/L (ref 3.5–5.1)
SODIUM SERPL-SCNC: 141 MMOL/L (ref 136–145)

## 2025-02-25 PROCEDURE — 25000003 PHARM REV CODE 250: Performed by: PHYSICIAN ASSISTANT

## 2025-02-25 PROCEDURE — 63600175 PHARM REV CODE 636 W HCPCS: Performed by: PHYSICIAN ASSISTANT

## 2025-02-25 PROCEDURE — 80048 BASIC METABOLIC PNL TOTAL CA: CPT | Performed by: PHYSICIAN ASSISTANT

## 2025-02-25 PROCEDURE — 99024 POSTOP FOLLOW-UP VISIT: CPT | Mod: POP,,, | Performed by: PHYSICIAN ASSISTANT

## 2025-02-25 PROCEDURE — 36415 COLL VENOUS BLD VENIPUNCTURE: CPT | Performed by: PHYSICIAN ASSISTANT

## 2025-02-25 RX ORDER — HYDROCODONE BITARTRATE AND ACETAMINOPHEN 5; 325 MG/1; MG/1
1 TABLET ORAL EVERY 6 HOURS PRN
Qty: 12 TABLET | Refills: 0 | Status: SHIPPED | OUTPATIENT
Start: 2025-02-25

## 2025-02-25 RX ORDER — METOPROLOL TARTRATE 25 MG/1
25 TABLET, FILM COATED ORAL 2 TIMES DAILY
Qty: 180 TABLET | Refills: 3 | Status: SHIPPED | OUTPATIENT
Start: 2025-02-25 | End: 2025-02-25 | Stop reason: SDUPTHER

## 2025-02-25 RX ORDER — HYDROCODONE BITARTRATE AND ACETAMINOPHEN 5; 325 MG/1; MG/1
1 TABLET ORAL EVERY 6 HOURS PRN
Qty: 12 TABLET | Refills: 0 | Status: SHIPPED | OUTPATIENT
Start: 2025-02-25 | End: 2025-02-25 | Stop reason: SDUPTHER

## 2025-02-25 RX ORDER — METOPROLOL TARTRATE 25 MG/1
25 TABLET, FILM COATED ORAL 2 TIMES DAILY
Status: DISCONTINUED | OUTPATIENT
Start: 2025-02-25 | End: 2025-02-25 | Stop reason: HOSPADM

## 2025-02-25 RX ORDER — METOPROLOL TARTRATE 25 MG/1
25 TABLET, FILM COATED ORAL 2 TIMES DAILY
Qty: 180 TABLET | Refills: 3 | Status: SHIPPED | OUTPATIENT
Start: 2025-02-25 | End: 2026-02-25

## 2025-02-25 RX ADMIN — HYDRALAZINE HYDROCHLORIDE 5 MG: 20 INJECTION INTRAMUSCULAR; INTRAVENOUS at 03:02

## 2025-02-25 RX ADMIN — MUPIROCIN: 20 OINTMENT TOPICAL at 08:02

## 2025-02-25 RX ADMIN — METOPROLOL TARTRATE 25 MG: 25 TABLET, FILM COATED ORAL at 09:02

## 2025-02-25 RX ADMIN — HYDRALAZINE HYDROCHLORIDE 5 MG: 20 INJECTION INTRAMUSCULAR; INTRAVENOUS at 08:02

## 2025-02-25 RX ADMIN — ASPIRIN 81 MG: 81 TABLET, COATED ORAL at 08:02

## 2025-02-25 NOTE — NURSING
Pt educated on d/c instructions. New meds were sent to [facility] pharmacy, but pt decided to have it switched to [facility] pharmacy due to an insurance issue. Pt's wife called [facility] to get it transferred there. Pt given info on follow up appts. Educated pt on s/s to watch for that would require a call or visit to the doctor. Pt verbalized understanding of d/c instructions. VSS and NAD. Pt denies any new needs or pain. IV and telemetry discontinued. Pt being discharged home via private vehicle with wife.

## 2025-02-25 NOTE — DISCHARGE SUMMARY
"[facility] 9th Floor Medical Telemetry  Cardiothoracic Surgery  Discharge Summary      Patient Name: Jd Nelson  MRN: [id number]  Admission Date: 2/24/2025  Hospital Length of Stay: 1 days  Discharge Date and Time:  02/25/2025 8:44 AM  Attending Physician: Cherise Greenwood MD   Discharging Provider: Hiram Aaron PA-C  Primary Care Provider: Galo Hussein MD    HPI:   No notes on file    Procedure(s) (LRB):  ENDARTERECTOMY-CAROTID (Left)      Indwelling Lines/Drains at time of discharge:   Lines/Drains/Airways       None                 Hospital Course: No notes on file    Goals of Care Treatment Preferences:             Significant Diagnostic Studies: Labs: BMP:   Recent Labs   Lab 02/25/25  0600      K 3.9      CO2 24   BUN 16.1   CREATININE 0.96   CALCIUM 8.5*   , CMP   Recent Labs   Lab 02/25/25  0600      K 3.9      CO2 24   BUN 16.1   CREATININE 0.96   CALCIUM 8.5*   , and CBC No results for input(s): "WBC", "HGB", "HCT", "PLT" in the last 48 hours.    Pending Diagnostic Studies:       Procedure Component Value Units Date/Time    Specimen to Pathology [[id number]] Collected: 02/24/25 1228    Order Status: Sent Lab Status: In process Updated: 02/24/25 1447    Specimen: Tissue from Carotid             No new Assessment & Plan notes have been filed under this hospital service since the last note was generated.  Service: Cardiovascular Surgery    Final Active Diagnoses:    Diagnosis Date Noted POA    PRINCIPAL PROBLEM:  Bilateral carotid artery stenosis [I65.23]  Yes      Problems Resolved During this Admission:      Discharged Condition: good    Disposition: Home or Self Care    Follow Up:    Patient Instructions:   No discharge procedures on file.  Medications:  Reconciled Home Medications:      Medication List        START taking these medications      HYDROcodone-acetaminophen 5-325 mg per tablet  Commonly known as: NORCO  Take 1 tablet by mouth every 6 " (six) hours as needed for Pain.     metoprolol tartrate 25 MG tablet  Commonly known as: LOPRESSOR  Take 1 tablet (25 mg total) by mouth 2 (two) times daily.            CHANGE how you take these medications      aspirin 325 MG EC tablet  Commonly known as: ECOTRIN  Take 1 tablet (325 mg total) by mouth once daily.  What changed: Another medication with the same name was removed. Continue taking this medication, and follow the directions you see here.            CONTINUE taking these medications      busPIRone 5 MG Tab  Commonly known as: BUSPAR  Take 1 tablet (5 mg total) by mouth 2 (two) times daily.     rosuvastatin 5 MG tablet  Commonly known as: CRESTOR  Take 5 mg by mouth every evening.     TAURINE ORAL  Take 1 tablet by mouth once daily.     telmisartan 80 MG Tab  Commonly known as: MICARDIS  Take 80 mg by mouth once daily.     VITAMIN C 1000 MG tablet  Generic drug: ascorbic acid (vitamin C)  Take 1,000 mg by mouth once daily.            Time spent on the discharge of patient: 30 minutes    Hiram Aaron PA-C  Cardiothoracic Surgery  [facility] - 9th Floor Medical Telemetry

## 2025-02-25 NOTE — PROGRESS NOTES
" CT SURGERY PROGRESS NOTE  Jd Nelson  69 y.o.  1955    Patients Procedure: Procedure(s) (LRB):  ENDARTERECTOMY-CAROTID (Left)    Subjective  Interval History:    Op Note     Signed     Creation Time: 2/24/2025  1:00 PM       Preop diagnosis:  Severe Lt carotid stenosis     Procedure:  Left carotid endarterectomy with pericardial patch angioplasty     Postop diagnosis:  The same     Surgeon:Cherise Greenwood MD     Assistant:  GREG Portillo     Date:  02/24/2025          ROS    Medication List  Infusions    Scheduled   aspirin  81 mg Oral Daily    docusate sodium  100 mg Oral BID    loperamide  4 mg Oral Once    mupirocin   Nasal BID       Objective:  Recent Vitals:  Temp:  [97.2 °F (36.2 °C)-98.8 °F (37.1 °C)] 98.8 °F (37.1 °C)  Pulse:  [] 91  Resp:  [8-20] 20  SpO2:  [83 %-97 %] 96 %  BP: ()/(49-91) 171/77    Physical Exam     I/O last 24 hrs:  Intake/Output - Last 3 Shifts         02/23 0700  02/24 0659 02/24 0700  02/25 0659 02/25 0700 02/26 0659    IV Piggyback  2000     Total Intake(mL/kg)  2000 (25.8)     Urine (mL/kg/hr)  110 (0.1)     Total Output  110     Net  +1890                    Labs  BMP:   Recent Labs   Lab 02/25/25  0600      K 3.9      CO2 24   BUN 16.1   CREATININE 0.96   CALCIUM 8.5*     CBC: No results for input(s): "WBC", "RBC", "HGB", "HCT", "PLT", "MCV", "MCH", "MCHC" in the last 48 hours.  CMP:   Recent Labs   Lab 02/25/25  0600   CALCIUM 8.5*      K 3.9   CO2 24      BUN 16.1   CREATININE 0.96         Imaging:   CXR: No results found in the last 24 hours.        ASSESSMENT/PLAN:    POD 1  VSS  SR   Neuro intact     A/P  DC home     Case and plan of care discussed with MD Hiram Aaron PAALYSE   "

## 2025-02-25 NOTE — PLAN OF CARE
02/25/25 0931   Discharge Assessment   Assessment Type Discharge Planning Assessment   Confirmed/corrected address, phone number and insurance Yes   Confirmed Demographics Correct on Facesheet   Source of Information patient   When was your last doctors appointment?   (Jan 2025)   Reason For Admission Bilateral carotid artery stenosis   People in Home spouse   Do you expect to return to your current living situation? Yes   Do you have help at home or someone to help you manage your care at home? Yes   Who are your caregiver(s) and their phone number(s)? KirtJenn cordero (Spouse)  [phone] (   Prior to hospitilization cognitive status: Alert/Oriented   Current cognitive status: Alert/Oriented   Walking or Climbing Stairs Difficulty no   Dressing/Bathing Difficulty no   Equipment Currently Used at Home blood pressure machine   Readmission within 30 days? No   Patient currently being followed by outpatient case management? No   Do you currently have service(s) that help you manage your care at home? No   Do you take prescription medications? Yes   Do you have prescription coverage? Yes   Coverage mcr   Do you have any problems affording any of your prescribed medications? No   Is the patient taking medications as prescribed? yes   Who is going to help you get home at discharge? Jenn Nelson (Spouse)  [phone] (   How do you get to doctors appointments? car, drives self   Are you on dialysis? No   Do you take coumadin? No   Discharge Plan A Home with family   Discharge Plan B Home with family   DME Needed Upon Discharge  none   Discharge Plan discussed with: Patient   Transition of Care Barriers None   Physical Activity   On average, how many days per week do you engage in moderate to strenuous exercise (like a brisk walk)? 0 days   On average, how many minutes do you engage in exercise at this level? 0 min   Financial Resource Strain   How hard is it for you to pay for the very basics like food, housing,  medical care, and heating? Not very   Housing Stability   In the last 12 months, was there a time when you were not able to pay the mortgage or rent on time? N   At any time in the past 12 months, were you homeless or living in a shelter (including now)? N   Transportation Needs   Has the lack of transportation kept you from medical appointments, meetings, work or from getting things needed for daily living? No   Food Insecurity   Within the past 12 months, you worried that your food would run out before you got the money to buy more. Never true   Within the past 12 months, the food you bought just didn't last and you didn't have money to get more. Never true   Stress   Do you feel stress - tense, restless, nervous, or anxious, or unable to sleep at night because your mind is troubled all the time - these days? Not at all   Social Isolation   How often do you feel lonely or isolated from those around you?  Never   Alcohol Use   Q1: How often do you have a drink containing alcohol? Never   [employer]   In the past 12 months has the electric, gas, oil, or water company threatened to shut off services in your home? No   Health Literacy   How often do you need to have someone help you when you read instructions, pamphlets, or other written material from your doctor or pharmacy? Rarely   OTHER   Name(s) of People in Home Jenn Nelson (Spouse)  [phone] (

## 2025-02-27 LAB — PSYCHE PATHOLOGY RESULT: NORMAL

## 2025-03-05 ENCOUNTER — LAB VISIT (OUTPATIENT)
Dept: LAB | Facility: HOSPITAL | Age: 70
End: 2025-03-05
Attending: NURSE PRACTITIONER
Payer: MEDICARE

## 2025-03-05 DIAGNOSIS — I65.23 BILATERAL CAROTID ARTERY STENOSIS: ICD-10-CM

## 2025-03-05 DIAGNOSIS — C91.10 CHRONIC LYMPHOCYTIC LEUKEMIA: ICD-10-CM

## 2025-03-05 LAB
ALBUMIN SERPL-MCNC: 3.6 G/DL (ref 3.4–4.8)
ALBUMIN/GLOB SERPL: 1.2 RATIO (ref 1.1–2)
ALP SERPL-CCNC: 64 UNIT/L (ref 40–150)
ALT SERPL-CCNC: 19 UNIT/L (ref 0–55)
ANION GAP SERPL CALC-SCNC: 7 MEQ/L
AST SERPL-CCNC: 19 UNIT/L (ref 5–34)
BASOPHILS # BLD AUTO: 0.02 X10(3)/MCL
BASOPHILS NFR BLD AUTO: 0.1 %
BILIRUB SERPL-MCNC: 0.4 MG/DL
BUN SERPL-MCNC: 17.1 MG/DL (ref 8.4–25.7)
CALCIUM SERPL-MCNC: 9.1 MG/DL (ref 8.8–10)
CHLORIDE SERPL-SCNC: 108 MMOL/L (ref 98–107)
CO2 SERPL-SCNC: 26 MMOL/L (ref 23–31)
CREAT SERPL-MCNC: 1.16 MG/DL (ref 0.72–1.25)
CREAT/UREA NIT SERPL: 15
EOSINOPHIL # BLD AUTO: 0.12 X10(3)/MCL (ref 0–0.9)
EOSINOPHIL NFR BLD AUTO: 0.9 %
ERYTHROCYTE [DISTWIDTH] IN BLOOD BY AUTOMATED COUNT: 13 % (ref 11.5–17)
GFR SERPLBLD CREATININE-BSD FMLA CKD-EPI: >60 ML/MIN/1.73/M2
GLOBULIN SER-MCNC: 3 GM/DL (ref 2.4–3.5)
GLUCOSE SERPL-MCNC: 118 MG/DL (ref 82–115)
HCT VFR BLD AUTO: 41 % (ref 42–52)
HGB BLD-MCNC: 13.5 G/DL (ref 14–18)
IMM GRANULOCYTES # BLD AUTO: 0.01 X10(3)/MCL (ref 0–0.04)
IMM GRANULOCYTES NFR BLD AUTO: 0.1 %
LDH SERPL-CCNC: 216 U/L (ref 125–220)
LYMPHOCYTES # BLD AUTO: 8.95 X10(3)/MCL (ref 0.6–4.6)
LYMPHOCYTES NFR BLD AUTO: 66.1 %
MCH RBC QN AUTO: 31.5 PG (ref 27–31)
MCHC RBC AUTO-ENTMCNC: 32.9 G/DL (ref 33–36)
MCV RBC AUTO: 95.8 FL (ref 80–94)
MONOCYTES # BLD AUTO: 0.25 X10(3)/MCL (ref 0.1–1.3)
MONOCYTES NFR BLD AUTO: 1.8 %
NEUTROPHILS # BLD AUTO: 4.19 X10(3)/MCL (ref 2.1–9.2)
NEUTROPHILS NFR BLD AUTO: 31 %
PLATELET # BLD AUTO: 187 X10(3)/MCL (ref 130–400)
PMV BLD AUTO: 9.6 FL (ref 7.4–10.4)
POTASSIUM SERPL-SCNC: 4.8 MMOL/L (ref 3.5–5.1)
PROT SERPL-MCNC: 6.6 GM/DL (ref 5.8–7.6)
RBC # BLD AUTO: 4.28 X10(6)/MCL (ref 4.7–6.1)
SODIUM SERPL-SCNC: 141 MMOL/L (ref 136–145)
WBC # BLD AUTO: 13.54 X10(3)/MCL (ref 4.5–11.5)

## 2025-03-05 PROCEDURE — 36415 COLL VENOUS BLD VENIPUNCTURE: CPT

## 2025-03-05 PROCEDURE — 83615 LACTATE (LD) (LDH) ENZYME: CPT

## 2025-03-05 PROCEDURE — 85025 COMPLETE CBC W/AUTO DIFF WBC: CPT

## 2025-03-05 PROCEDURE — 80053 COMPREHEN METABOLIC PANEL: CPT

## 2025-03-05 NOTE — PROGRESS NOTES
Subjective:       Patient ID: Jd Nelson is a 69 y.o. male.    PCP: Dr. Galo Hussein    Chronic Lymphocytic Leukemia Joshua Stage I--Diagnosed 7/31/24    Work-up:  1. Peripheral smear review done 7/26/24--Numerous small to medium size lymphocytes with compact chromatin pattern, mildly irregular nuclei, small amount of azurophilic cytoplasm and occasional nucleoli are present. Innumerable smudge cells are noted. These findings are suspicious for a chronic lymphoproliferative disorder. A flow cytometry analysis of the lymphoid population is recommended if not already performed. The red blood cells, granulocytes, monocytes and platelets are unremarkable.   2. Flow cytometry on peripheral blood done 7/31/24--Chronic lymphocytic leukemia, biclonal (predominately kappa light chain restricted).   3. 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   Imaging:  CT soft tissue neck/C/A/P 8/20/24--no lymphadenopathy in neck, advanced atherosclerotic disease at the bilateral carotid bulbs and proximal internal carotid arteries with high grade stenosis at right proximal ICA and approximately 50% stenosis at left proximal ICA, recommend doppler US, diffuse cervical spine degenerative disease, single enlarged pre-vascular mediastinal LN vs. 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     Current treatment plan: Observation    Chief Complaint: Other Misc (Pt reports no concerns today./)    HPI  Patient here for follow-up of CLL.  He is doing well. His wife is with him today. Recent labs are good. WBC improved. No other concerning  "symptoms. He underwent left CEA on 2/24/25 by Dr. Greenwood. Procedure went well. Plan is to address the thymic lesion in the near future. He has an upcoming scan and appointment. No other problems reported.     Past Medical History:   Diagnosis Date    Bilateral carotid artery stenosis     CLL (chronic lymphocytic leukemia)     Dyslipidemia 04/05/2023    Hyperlipidemia     Irritable bowel syndrome 04/05/2023    Mediastinal mass     Primary hypertension 04/05/2023    Retina disorder     "clot"    Subclavian artery stenosis, left       Past Surgical History:   Procedure Laterality Date    CAROTID ENDARTERECTOMY Right 11/26/2024    Procedure: ENDARTERECTOMY-CAROTID;  Surgeon: Cherise Greenwood MD;  Location: [facility];  Service: Cardiology;  Laterality: Right;    CAROTID ENDARTERECTOMY Left 2/24/2025    Procedure: ENDARTERECTOMY-CAROTID;  Surgeon: Cherise Greenwood MD;  Location: [facility];  Service: Cardiology;  Laterality: Left;    COLONOSCOPY      WISDOM TOOTH EXTRACTION       Family History   Problem Relation Name Age of Onset    Asthma Sister Sister     Diabetes Brother Brother     Cancer Father Father      Social History     Socioeconomic History    Marital status:    Tobacco Use    Smoking status: Never    Smokeless tobacco: Never   Substance and Sexual Activity    Alcohol use: Not Currently    Drug use: Not Currently    Sexual activity: Not Currently     Social Drivers of Health     Financial Resource Strain: Low Risk  (2/25/2025)    Overall Financial Resource Strain (CARDIA)     Difficulty of Paying Living Expenses: Not very hard   Food Insecurity: No Food Insecurity (2/25/2025)    Hunger Vital Sign     Worried About Running Out of Food in the Last Year: Never true     Ran Out of Food in the Last Year: Never true   Transportation Needs: No Transportation Needs (11/26/2024)    TRANSPORTATION NEEDS     Transportation : No   Physical Activity: Inactive (2/25/2025)    Exercise Vital Sign     Days of Exercise per " Week: 0 days     Minutes of Exercise per Session: 0 min   Stress: No Stress Concern Present (2/25/2025)    [employer] [place] [employer] - Occupational Stress Questionnaire     Feeling of Stress : Not at all   Housing Stability: Low Risk  (2/25/2025)    Housing Stability Vital Sign     Unable to Pay for Housing in the Last Year: No     Homeless in the Last Year: No       Review of patient's allergies indicates:  No Known Allergies   Current Outpatient Medications on File Prior to Visit   Medication Sig Dispense Refill    ascorbic acid, vitamin C, (VITAMIN C) 1000 MG tablet Take 1,000 mg by mouth once daily.      busPIRone (BUSPAR) 5 MG Tab Take 1 tablet (5 mg total) by mouth 2 (two) times daily. 60 tablet 11    metoprolol tartrate (LOPRESSOR) 25 MG tablet Take 1 tablet (25 mg total) by mouth 2 (two) times daily. 180 tablet 3    rosuvastatin (CRESTOR) 5 MG tablet Take 5 mg by mouth every evening.      TAURINE ORAL Take 1 tablet by mouth once daily.      telmisartan (MICARDIS) 80 MG Tab Take 80 mg by mouth once daily.       No current facility-administered medications on file prior to visit.      Review of Systems   Constitutional:  Negative for appetite change, fatigue, fever and unexpected weight change.   HENT:  Negative for mouth sores.    Eyes: Negative.    Respiratory:  Negative for cough and shortness of breath.    Cardiovascular:  Negative for chest pain and leg swelling.   Gastrointestinal:  Negative for abdominal distention, abdominal pain, constipation, diarrhea, nausea, vomiting and reflux.   Genitourinary:  Negative for difficulty urinating, dysuria and hematuria.   Musculoskeletal:  Negative for arthralgias and back pain.   Integumentary:  Negative for rash.   Neurological:  Negative for weakness and headaches.   Hematological:  Negative for adenopathy.   Psychiatric/Behavioral:  Negative for sleep disturbance. The patient is not nervous/anxious.          Vitals:    03/10/25 1329   BP: (!)  "198/88   Pulse: 85   Resp: 14   Temp: 98.2 °F (36.8 °C)   TempSrc: Oral   SpO2: 97%   Weight: 79.6 kg (175 lb 6.4 oz)   Height: 5' 6" (1.676 m)     Physical Exam  Constitutional:       General: He is awake.      Appearance: Normal appearance.   HENT:      Head: Normocephalic and atraumatic.      Nose: Nose normal.      Mouth/Throat:      Mouth: Mucous membranes are moist.   Eyes:      General: Vision grossly intact.      Extraocular Movements: Extraocular movements intact.      Conjunctiva/sclera: Conjunctivae normal.   Neck:      Comments: Incision right neck from recent CEA, healed well. Left neck incision healing well.   Cardiovascular:      Rate and Rhythm: Normal rate and regular rhythm.      Heart sounds: Normal heart sounds.   Pulmonary:      Effort: Pulmonary effort is normal.      Breath sounds: Normal breath sounds.   Abdominal:      General: Bowel sounds are normal. There is no distension.      Palpations: Abdomen is soft.      Tenderness: There is no abdominal tenderness.   Musculoskeletal:      Cervical back: Normal range of motion and neck supple.      Right lower leg: No edema.      Left lower leg: No edema.   Lymphadenopathy:      Cervical: No cervical adenopathy.      Upper Body:      Right upper body: No supraclavicular adenopathy.      Left upper body: No supraclavicular adenopathy.   Skin:     General: Skin is warm.   Neurological:      Mental Status: He is alert and oriented to person, place, and time.      Motor: Motor function is intact.   Psychiatric:         Mood and Affect: Mood normal.         Speech: Speech normal.         Behavior: Behavior is cooperative.         Judgment: Judgment normal.       Lab Visit on 03/05/2025   Component Date Value    QRS Duration 02/17/2025 96     OHS QTC Calculation 02/17/2025 425     Sodium 03/05/2025 141     Potassium 03/05/2025 4.8     Chloride 03/05/2025 108 (H)     CO2 03/05/2025 26     Glucose 03/05/2025 118 (H)     Blood Urea Nitrogen 03/05/2025 17.1  "    Creatinine 03/05/2025 1.16     Calcium 03/05/2025 9.1     Protein Total 03/05/2025 6.6     Albumin 03/05/2025 3.6     Globulin 03/05/2025 3.0     Albumin/Globulin Ratio 03/05/2025 1.2     Bilirubin Total 03/05/2025 0.4     ALP 03/05/2025 64     ALT 03/05/2025 19     AST 03/05/2025 19     eGFR 03/05/2025 >60     Anion Gap 03/05/2025 7.0     BUN/Creatinine Ratio 03/05/2025 15     Lactate Dehydrogenase 03/05/2025 216     WBC 03/05/2025 13.54 (H)     RBC 03/05/2025 4.28 (L)     Hgb 03/05/2025 13.5 (L)     Hct 03/05/2025 41.0 (L)     MCV 03/05/2025 95.8 (H)     MCH 03/05/2025 31.5 (H)     MCHC 03/05/2025 32.9 (L)     RDW 03/05/2025 13.0     Platelet 03/05/2025 187     MPV 03/05/2025 9.6     Neut % 03/05/2025 31.0     Lymph % 03/05/2025 66.1     Mono % 03/05/2025 1.8     Eos % 03/05/2025 0.9     Basophil % 03/05/2025 0.1     Imm Grans % 03/05/2025 0.1     Neut # 03/05/2025 4.19     Lymph # 03/05/2025 8.95 (H)     Mono # 03/05/2025 0.25     Eos # 03/05/2025 0.12     Baso # 03/05/2025 0.02     Imm Gran # 03/05/2025 0.01    Admission on 02/24/2025, Discharged on 02/25/2025   Component Date Value    Pathology Result 02/24/2025      Sodium 02/25/2025 141     Potassium 02/25/2025 3.9     Chloride 02/25/2025 107     CO2 02/25/2025 24     Glucose 02/25/2025 99     Blood Urea Nitrogen 02/25/2025 16.1     Creatinine 02/25/2025 0.96     BUN/Creatinine Ratio 02/25/2025 17     Calcium 02/25/2025 8.5 (L)     Anion Gap 02/25/2025 10.0     eGFR 02/25/2025 >60           Assessment:       1. Chronic lymphocytic leukemia        Plan:       Patient with CLL, Joshua Stage I.  CT soft tissue neck/C/A/P done 8/20/24 shows only lymphadenopathy in left inguinal region.  Remainder of CBC is normal.  He has no B-symptoms.  FISH with 13q deletion and IgHV hypermutation c/w favorable prognosis.    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 Referred to CV surgery and underwent right CEA in  December 2024.   Recently underwent Left CEA on 2/24/25 with Dr. Greenwood.   Plan is to potentially remove the thymic mass in the near future.     CBC with improved WBC. Mild anemia noted with Hgb 13.5 g/dL which could be from recent surgical procedure. MCV and MCH elevated. Normal platelets. LDH normal.  No indications for any treatment at this time.  Recommend continued observation.  F/u in 3 months with labs and visit for observation of CLL.   All questions answered at this time.      [facility]    Visit today included increased complexity associated with the longitudinal care of the patient's chronic CLL.

## 2025-03-10 ENCOUNTER — OFFICE VISIT (OUTPATIENT)
Dept: HEMATOLOGY/ONCOLOGY | Facility: CLINIC | Age: 70
End: 2025-03-10
Payer: MEDICARE

## 2025-03-10 VITALS
TEMPERATURE: 98 F | WEIGHT: 175.38 LBS | HEIGHT: 66 IN | DIASTOLIC BLOOD PRESSURE: 88 MMHG | RESPIRATION RATE: 14 BRPM | OXYGEN SATURATION: 97 % | BODY MASS INDEX: 28.19 KG/M2 | HEART RATE: 85 BPM | SYSTOLIC BLOOD PRESSURE: 198 MMHG

## 2025-03-10 DIAGNOSIS — C91.10 CHRONIC LYMPHOCYTIC LEUKEMIA: Primary | Chronic | ICD-10-CM

## 2025-03-10 PROCEDURE — 99999 PR PBB SHADOW E&M-EST. PATIENT-LVL III: CPT | Mod: PBBFAC,,, | Performed by: NURSE PRACTITIONER

## 2025-03-10 PROCEDURE — 99214 OFFICE O/P EST MOD 30 MIN: CPT | Mod: S$PBB,,, | Performed by: NURSE PRACTITIONER

## 2025-03-10 PROCEDURE — 99213 OFFICE O/P EST LOW 20 MIN: CPT | Mod: PBBFAC | Performed by: NURSE PRACTITIONER

## 2025-03-12 ENCOUNTER — HOSPITAL ENCOUNTER (OUTPATIENT)
Dept: RADIOLOGY | Facility: HOSPITAL | Age: 70
Discharge: HOME | End: 2025-03-12
Attending: THORACIC SURGERY (CARDIOTHORACIC VASCULAR SURGERY)
Payer: MEDICARE

## 2025-03-12 DIAGNOSIS — J98.59 MEDIASTINAL MASS: ICD-10-CM

## 2025-03-12 PROCEDURE — 25500020 PHARM REV CODE 255: Performed by: THORACIC SURGERY (CARDIOTHORACIC VASCULAR SURGERY)

## 2025-03-12 PROCEDURE — 71260 CT THORAX DX C+: CPT | Mod: TC

## 2025-03-12 RX ADMIN — IOHEXOL 75 ML: 350 INJECTION, SOLUTION INTRAVENOUS at 08:03

## 2025-04-02 ENCOUNTER — OFFICE VISIT (OUTPATIENT)
Dept: CARDIAC SURGERY | Facility: CLINIC | Age: 70
End: 2025-04-02
Payer: MEDICARE

## 2025-04-02 VITALS
HEART RATE: 73 BPM | SYSTOLIC BLOOD PRESSURE: 181 MMHG | DIASTOLIC BLOOD PRESSURE: 98 MMHG | HEIGHT: 66 IN | BODY MASS INDEX: 29.09 KG/M2 | WEIGHT: 181 LBS | OXYGEN SATURATION: 96 %

## 2025-04-02 DIAGNOSIS — J98.59 MEDIASTINAL MASS: ICD-10-CM

## 2025-04-02 DIAGNOSIS — I65.23 BILATERAL CAROTID ARTERY STENOSIS: Primary | ICD-10-CM

## 2025-04-02 PROCEDURE — 99024 POSTOP FOLLOW-UP VISIT: CPT | Mod: POP,,, | Performed by: THORACIC SURGERY (CARDIOTHORACIC VASCULAR SURGERY)

## 2025-04-02 RX ORDER — AMLODIPINE BESYLATE 5 MG/1
5 TABLET ORAL NIGHTLY
COMMUNITY
Start: 2025-03-18

## 2025-04-02 RX ORDER — CLONIDINE HYDROCHLORIDE 0.1 MG/1
0.1 TABLET ORAL
COMMUNITY
Start: 2025-03-14

## 2025-04-02 NOTE — PROGRESS NOTES
"Jd Nelson is a 69 y.o. male patient.   No diagnosis found.  Past Medical History:   Diagnosis Date    Bilateral carotid artery stenosis     CLL (chronic lymphocytic leukemia)     Dyslipidemia 04/05/2023    Hyperlipidemia     Irritable bowel syndrome 04/05/2023    Mediastinal mass     Primary hypertension 04/05/2023    Retina disorder     "clot"    Subclavian artery stenosis, left      No past surgical history pertinent negatives on file.  Scheduled Meds:  Continuous Infusions:  PRN Meds:    Review of patient's allergies indicates:  No Known Allergies  There are no hospital problems to display for this patient.    Blood pressure (!) 181/98, pulse 73, height 5' 6" (1.676 m), weight 82.1 kg (181 lb), SpO2 96%.    Subjective:  The patient has clinic status post bilateral carotid endarterectomy.  He has been doing well with no complaints.      Objective:  His wounds have healed well.  CT scan of the chest revealed no change in the size of the anterior mediastinal mass.      Assessment & Plan:  Follow up carotid ultrasound with , CT scan of the chest in 1 year      Cherise Greenwood MD  4/2/2025    "

## 2025-04-09 ENCOUNTER — OFFICE VISIT (OUTPATIENT)
Dept: PRIMARY CARE CLINIC | Facility: CLINIC | Age: 70
End: 2025-04-09
Payer: MEDICARE

## 2025-04-09 VITALS — DIASTOLIC BLOOD PRESSURE: 67 MMHG | SYSTOLIC BLOOD PRESSURE: 135 MMHG

## 2025-04-09 DIAGNOSIS — B35.1 ONYCHOMYCOSIS: ICD-10-CM

## 2025-04-09 DIAGNOSIS — F41.1 GAD (GENERALIZED ANXIETY DISORDER): Primary | Chronic | ICD-10-CM

## 2025-04-09 RX ORDER — TERBINAFINE HYDROCHLORIDE 250 MG/1
250 TABLET ORAL DAILY
Qty: 45 TABLET | Refills: 1 | Status: SHIPPED | OUTPATIENT
Start: 2025-04-09 | End: 2025-07-08

## 2025-04-09 RX ORDER — ESCITALOPRAM OXALATE 5 MG/1
5 TABLET ORAL DAILY
Qty: 90 TABLET | Refills: 3 | Status: SHIPPED | OUTPATIENT
Start: 2025-04-09 | End: 2026-04-09

## 2025-04-09 NOTE — PROGRESS NOTES
"Subjective:       Patient ID: Jd Nelson is a 70 y.o. male.    Chief Complaint: No chief complaint on file.    Established patient, presents to the clinic wishing to discuss anxiety and concerns about a possible toenail fungus.  He does have some situational anxiety, he reports that it is mild, but he would like to start Lexapro 5 mg daily.    He also suffers with discoloration of his toenails, appears fairly typical for onychomycosis, would like medication for that as well.      Review of Systems   Constitutional: Negative.  Negative for fatigue and fever.   HENT: Negative.  Negative for sore throat and trouble swallowing.    Eyes: Negative.  Negative for redness and visual disturbance.   Respiratory: Negative.  Negative for chest tightness and shortness of breath.    Cardiovascular: Negative.  Negative for chest pain.   Gastrointestinal: Negative.  Negative for abdominal pain, blood in stool and nausea.   Endocrine: Negative.  Negative for cold intolerance, heat intolerance and polyuria.   Genitourinary: Negative.    Musculoskeletal: Negative.  Negative for arthralgias, gait problem and joint swelling.   Integumentary:  Negative for rash.        Toenail discoloration   Neurological: Negative.  Negative for dizziness, weakness and headaches.   Psychiatric/Behavioral:  Negative for agitation and dysphoric mood. The patient is nervous/anxious.            Patient Care Team:  Galo Hussein MD as PCP - General (Family Medicine)  Joao St MD as Consulting Physician (Urology)  Enoc Gaytan MD as Consulting Physician (Gastroenterology)  Karly Hernandez MD as Consulting Physician (Oncology)  Cherise Greenwood MD as Consulting Physician (Cardiothoracic Surgery)  Objective:   Visit Vitals  Pulse (P) 89   Ht (P) 5' 6" (1.676 m)   Wt (P) 80.2 kg (176 lb 12.8 oz)   BMI (P) 28.54 kg/m²        Physical Exam  Constitutional:       Appearance: Normal appearance.   HENT:      Head: Normocephalic.      " Mouth/Throat:      Mouth: Mucous membranes are moist.      Pharynx: Oropharynx is clear.   Eyes:      Conjunctiva/sclera: Conjunctivae normal.      Pupils: Pupils are equal, round, and reactive to light.   Cardiovascular:      Rate and Rhythm: Normal rate and regular rhythm.      Heart sounds: Normal heart sounds.   Pulmonary:      Effort: Pulmonary effort is normal.      Breath sounds: Normal breath sounds.   Abdominal:      General: Abdomen is flat.      Palpations: Abdomen is soft.   Musculoskeletal:         General: Normal range of motion.      Cervical back: Neck supple.   Skin:     General: Skin is warm and dry.      Comments: Thick discolored toenails typical for onychomycosis   Neurological:      General: No focal deficit present.      Mental Status: He is alert and oriented to person, place, and time.   Psychiatric:         Mood and Affect: Mood normal.         Behavior: Behavior normal.         Thought Content: Thought content normal.         Judgment: Judgment normal.           Lab Results   Component Value Date     03/05/2025    K 4.8 03/05/2025     (H) 03/05/2025    CO2 26 03/05/2025    BUN 17.1 03/05/2025    CREATININE 1.16 03/05/2025    CALCIUM 9.1 03/05/2025    ALBUMIN 3.6 03/05/2025    BILITOT 0.4 03/05/2025    ALKPHOS 64 03/05/2025    AST 19 03/05/2025    ALT 19 03/05/2025    EGFRNORACEVR >60 03/05/2025     Lab Results   Component Value Date    WBC 13.54 (H) 03/05/2025    RBC 4.28 (L) 03/05/2025    HGB 13.5 (L) 03/05/2025    HCT 41.0 (L) 03/05/2025    MCV 95.8 (H) 03/05/2025    RDW 13.0 03/05/2025     03/05/2025      Lab Results   Component Value Date    CHOL 238 (H) 07/26/2024    TRIG 130 07/26/2024    HDL 51 07/26/2024    .00 (H) 07/26/2024    TOTALCHOLEST 5 07/26/2024     Lab Results   Component Value Date    TSH 1.179 07/26/2024     Lab Results   Component Value Date    HGBA1C 5.4 07/26/2024        Lab Results   Component Value Date    PSA 11.55 (H) 04/18/2019          Assessment:       ICD-10-CM ICD-9-CM   1. ANJANA (generalized anxiety disorder)  F41.1 300.02   2. Onychomycosis  B35.1 110.1       Current Outpatient Medications   Medication Instructions    amLODIPine (NORVASC) 5 mg, Nightly    ascorbic acid (vitamin C) (VITAMIN C) 1,000 mg, Daily    busPIRone (BUSPAR) 5 mg, Oral, 2 times daily    cloNIDine (CATAPRES) 0.1 mg    EScitalopram oxalate (LEXAPRO) 5 mg, Oral, Daily    metoprolol tartrate (LOPRESSOR) 25 mg, Oral, 2 times daily    rosuvastatin (CRESTOR) 5 mg, Nightly    TAURINE ORAL 1 tablet, Daily    telmisartan (MICARDIS) 80 mg, Daily    terbinafine HCL (LAMISIL) 250 mg, Oral, Daily     Plan:     Problem List Items Addressed This Visit          Psychiatric    ANJANA (generalized anxiety disorder) - Primary (Chronic)    Overview   Prescribed Lexapro 5 mg daily, buspirone 5 mg daily.    Lexapro started on 04/09/2025.    Patient reports stability of moods, and effectiveness of medications, including no agitation, significant somnolence, or significant bouts of anxiety or depression.  Patient is not having any sleep disturbance.  Current treatment plan will continue, with plans to discuss any significant changes in patient's status if necessary if anything changes in the future..    Medication will be continued at current dosage.         Relevant Medications    EScitalopram oxalate (LEXAPRO) 5 MG Tab       Derm    Onychomycosis    Overview   Prescribed Lamisil 250 mg daily for three months.    CMP med treatment, hold refill after six weeks until we determine that liver enzymes are normal.         Relevant Medications    terbinafine HCL (LAMISIL) 250 mg tablet    Other Relevant Orders    Comprehensive Metabolic Panel               Follow up for Pain management F/up virtual.    This note was created with the assistance of [email or web address] voice recognition software or phone dictation. There may be transcription errors as a result of using this technology. Effort has been made to  assure accuracy of transcription but any obvious errors or omissions should be clarified with the author of the document.

## 2025-05-09 ENCOUNTER — CLINICAL SUPPORT (OUTPATIENT)
Dept: PRIMARY CARE CLINIC | Facility: CLINIC | Age: 70
End: 2025-05-09
Payer: MEDICARE

## 2025-05-09 DIAGNOSIS — B35.1 ONYCHOMYCOSIS: ICD-10-CM

## 2025-05-09 LAB
ALBUMIN SERPL-MCNC: 4.1 G/DL (ref 3.4–4.8)
ALBUMIN/GLOB SERPL: 1.4 RATIO (ref 1.1–2)
ALP SERPL-CCNC: 66 UNIT/L (ref 40–150)
ALT SERPL-CCNC: 15 UNIT/L (ref 0–55)
ANION GAP SERPL CALC-SCNC: 9 MEQ/L
AST SERPL-CCNC: 16 UNIT/L (ref 11–45)
BILIRUB SERPL-MCNC: 0.4 MG/DL
BUN SERPL-MCNC: 21.4 MG/DL (ref 8.4–25.7)
CALCIUM SERPL-MCNC: 8.8 MG/DL (ref 8.8–10)
CHLORIDE SERPL-SCNC: 108 MMOL/L (ref 98–107)
CO2 SERPL-SCNC: 23 MMOL/L (ref 23–31)
CREAT SERPL-MCNC: 1.09 MG/DL (ref 0.72–1.25)
CREAT/UREA NIT SERPL: 20
GFR SERPLBLD CREATININE-BSD FMLA CKD-EPI: >60 ML/MIN/1.73/M2
GLOBULIN SER-MCNC: 3 GM/DL (ref 2.4–3.5)
GLUCOSE SERPL-MCNC: 108 MG/DL (ref 82–115)
POTASSIUM SERPL-SCNC: 4 MMOL/L (ref 3.5–5.1)
PROT SERPL-MCNC: 7.1 GM/DL (ref 5.8–7.6)
SODIUM SERPL-SCNC: 140 MMOL/L (ref 136–145)

## 2025-05-09 PROCEDURE — 80053 COMPREHEN METABOLIC PANEL: CPT | Performed by: GENERAL PRACTICE

## 2025-05-09 NOTE — PROGRESS NOTES
Patient verified name and .Patient here for nurse visit to draw CMP labs with (23) gauge needle. Patient was drawn in left antecubital .No complications or issues occurred. Patient tolerated venipuncture well. Patient expressed no questions or concerns.

## 2025-05-20 ENCOUNTER — RESULTS FOLLOW-UP (OUTPATIENT)
Dept: PRIMARY CARE CLINIC | Facility: CLINIC | Age: 70
End: 2025-05-20

## 2025-06-23 ENCOUNTER — LAB VISIT (OUTPATIENT)
Dept: LAB | Facility: HOSPITAL | Age: 70
End: 2025-06-23
Attending: NURSE PRACTITIONER
Payer: MEDICARE

## 2025-06-23 DIAGNOSIS — J98.59 MEDIASTINAL MASS: ICD-10-CM

## 2025-06-23 DIAGNOSIS — C91.10 CHRONIC LYMPHOCYTIC LEUKEMIA: ICD-10-CM

## 2025-06-23 LAB
ALBUMIN SERPL-MCNC: 3.8 G/DL (ref 3.4–4.8)
ALBUMIN/GLOB SERPL: 1.1 RATIO (ref 1.1–2)
ALP SERPL-CCNC: 74 UNIT/L (ref 40–150)
ALT SERPL-CCNC: 15 UNIT/L (ref 0–55)
ANION GAP SERPL CALC-SCNC: 7 MEQ/L
AST SERPL-CCNC: 18 UNIT/L (ref 11–45)
BASOPHILS # BLD AUTO: 0.02 X10(3)/MCL
BASOPHILS NFR BLD AUTO: 0.1 %
BILIRUB SERPL-MCNC: 0.4 MG/DL
BUN SERPL-MCNC: 19.3 MG/DL (ref 8.4–25.7)
CALCIUM SERPL-MCNC: 9.4 MG/DL (ref 8.8–10)
CHLORIDE SERPL-SCNC: 105 MMOL/L (ref 98–107)
CO2 SERPL-SCNC: 26 MMOL/L (ref 23–31)
CREAT SERPL-MCNC: 1.17 MG/DL (ref 0.72–1.25)
CREAT/UREA NIT SERPL: 16
EOSINOPHIL # BLD AUTO: 0.2 X10(3)/MCL (ref 0–0.9)
EOSINOPHIL NFR BLD AUTO: 1.4 %
ERYTHROCYTE [DISTWIDTH] IN BLOOD BY AUTOMATED COUNT: 12.8 % (ref 11.5–17)
GFR SERPLBLD CREATININE-BSD FMLA CKD-EPI: >60 ML/MIN/1.73/M2
GLOBULIN SER-MCNC: 3.5 GM/DL (ref 2.4–3.5)
GLUCOSE SERPL-MCNC: 109 MG/DL (ref 82–115)
HCT VFR BLD AUTO: 41.3 % (ref 42–52)
HGB BLD-MCNC: 13.9 G/DL (ref 14–18)
IMM GRANULOCYTES # BLD AUTO: 0.02 X10(3)/MCL (ref 0–0.04)
IMM GRANULOCYTES NFR BLD AUTO: 0.1 %
LDH SERPL-CCNC: 274 U/L (ref 125–220)
LYMPHOCYTES # BLD AUTO: 9.83 X10(3)/MCL (ref 0.6–4.6)
LYMPHOCYTES NFR BLD AUTO: 68 %
MCH RBC QN AUTO: 32 PG (ref 27–31)
MCHC RBC AUTO-ENTMCNC: 33.7 G/DL (ref 33–36)
MCV RBC AUTO: 94.9 FL (ref 80–94)
MONOCYTES # BLD AUTO: 0.51 X10(3)/MCL (ref 0.1–1.3)
MONOCYTES NFR BLD AUTO: 3.5 %
NEUTROPHILS # BLD AUTO: 3.88 X10(3)/MCL (ref 2.1–9.2)
NEUTROPHILS NFR BLD AUTO: 26.9 %
PLATELET # BLD AUTO: 199 X10(3)/MCL (ref 130–400)
PMV BLD AUTO: 9.8 FL (ref 7.4–10.4)
POTASSIUM SERPL-SCNC: 4.3 MMOL/L (ref 3.5–5.1)
PROT SERPL-MCNC: 7.3 GM/DL (ref 5.8–7.6)
RBC # BLD AUTO: 4.35 X10(6)/MCL (ref 4.7–6.1)
SODIUM SERPL-SCNC: 138 MMOL/L (ref 136–145)
WBC # BLD AUTO: 14.46 X10(3)/MCL (ref 4.5–11.5)

## 2025-06-23 PROCEDURE — 80053 COMPREHEN METABOLIC PANEL: CPT

## 2025-06-23 PROCEDURE — 85025 COMPLETE CBC W/AUTO DIFF WBC: CPT

## 2025-06-23 PROCEDURE — 83615 LACTATE (LD) (LDH) ENZYME: CPT

## 2025-06-23 PROCEDURE — 36415 COLL VENOUS BLD VENIPUNCTURE: CPT

## 2025-06-26 ENCOUNTER — OFFICE VISIT (OUTPATIENT)
Dept: HEMATOLOGY/ONCOLOGY | Facility: CLINIC | Age: 70
End: 2025-06-26
Payer: MEDICARE

## 2025-06-26 VITALS
WEIGHT: 177.81 LBS | HEIGHT: 66 IN | OXYGEN SATURATION: 96 % | BODY MASS INDEX: 28.57 KG/M2 | TEMPERATURE: 98 F | SYSTOLIC BLOOD PRESSURE: 197 MMHG | HEART RATE: 66 BPM | DIASTOLIC BLOOD PRESSURE: 79 MMHG | RESPIRATION RATE: 18 BRPM

## 2025-06-26 DIAGNOSIS — C91.10 CHRONIC LYMPHOCYTIC LEUKEMIA: Primary | ICD-10-CM

## 2025-06-26 PROCEDURE — 99214 OFFICE O/P EST MOD 30 MIN: CPT | Mod: S$PBB,,, | Performed by: NURSE PRACTITIONER

## 2025-06-26 PROCEDURE — 99214 OFFICE O/P EST MOD 30 MIN: CPT | Mod: PBBFAC | Performed by: NURSE PRACTITIONER

## 2025-06-26 PROCEDURE — G2211 COMPLEX E/M VISIT ADD ON: HCPCS | Mod: ,,, | Performed by: NURSE PRACTITIONER

## 2025-06-26 PROCEDURE — 99999 PR PBB SHADOW E&M-EST. PATIENT-LVL IV: CPT | Mod: PBBFAC,,, | Performed by: NURSE PRACTITIONER

## 2025-06-26 NOTE — PROGRESS NOTES
Subjective:       Patient ID: Jd Nelson is a 70 y.o. male.    PCP: Dr. Galo Hussein    Chronic Lymphocytic Leukemia Joshua Stage I--Diagnosed 7/31/24    Work-up:  1. Peripheral smear review done 7/26/24--Numerous small to medium size lymphocytes with compact chromatin pattern, mildly irregular nuclei, small amount of azurophilic cytoplasm and occasional nucleoli are present. Innumerable smudge cells are noted. These findings are suspicious for a chronic lymphoproliferative disorder. A flow cytometry analysis of the lymphoid population is recommended if not already performed. The red blood cells, granulocytes, monocytes and platelets are unremarkable.   2. Flow cytometry on peripheral blood done 7/31/24--Chronic lymphocytic leukemia, biclonal (predominately kappa light chain restricted).   3. 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   Imaging:  CT soft tissue neck/C/A/P 8/20/24--no lymphadenopathy in neck, advanced atherosclerotic disease at the bilateral carotid bulbs and proximal internal carotid arteries with high grade stenosis at right proximal ICA and approximately 50% stenosis at left proximal ICA, recommend doppler US, diffuse cervical spine degenerative disease, single enlarged pre-vascular mediastinal LN vs. 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     Current treatment plan: Observation    Chief Complaint: 3 Month Follow Up    HPI  Patient here for follow-up of CLL.  He is doing well. His wife is with him today. Recent labs are good. WBC is stable. ALC improved.  On 06/17/25 , he had  "peripheral angiogram with stent placement with Dr. Tripp.     Past Medical History:   Diagnosis Date    Bilateral carotid artery stenosis     CLL (chronic lymphocytic leukemia)     Dyslipidemia 04/05/2023    GERD (gastroesophageal reflux disease)     Hyperlipidemia     Irritable bowel syndrome 04/05/2023    Mediastinal mass     Primary hypertension 04/05/2023    Retina disorder     "clot"    Subclavian artery stenosis, left       Past Surgical History:   Procedure Laterality Date    CAROTID ENDARTERECTOMY Right 11/26/2024    Procedure: ENDARTERECTOMY-CAROTID;  Surgeon: Cherise Greenwood MD;  Location: [facility];  Service: Cardiology;  Laterality: Right;    CAROTID ENDARTERECTOMY Left 02/24/2025    Procedure: ENDARTERECTOMY-CAROTID;  Surgeon: Cherise Greenwood MD;  Location: [facility];  Service: Cardiology;  Laterality: Left;    COLONOSCOPY      PLACEMENT-STENT  06/17/2025    WISDOM TOOTH EXTRACTION       Family History   Problem Relation Name Age of Onset    Asthma Sister Sister     Diabetes Brother Brother     Cancer Father Father      Social History     Socioeconomic History    Marital status:    Tobacco Use    Smoking status: Never    Smokeless tobacco: Never   Substance and Sexual Activity    Alcohol use: Not Currently    Drug use: Not Currently    Sexual activity: Not Currently     Social Drivers of Health     Financial Resource Strain: Low Risk  (2/25/2025)    Overall Financial Resource Strain (CARDIA)     Difficulty of Paying Living Expenses: Not very hard   Food Insecurity: No Food Insecurity (2/25/2025)    Hunger Vital Sign     Worried About Running Out of Food in the Last Year: Never true     Ran Out of Food in the Last Year: Never true   Transportation Needs: No Transportation Needs (11/26/2024)    TRANSPORTATION NEEDS     Transportation : No   Physical Activity: Inactive (2/25/2025)    Exercise Vital Sign     Days of Exercise per Week: 0 days     Minutes of Exercise per Session: 0 min   Stress: No " Stress Concern Present (2/25/2025)    [employer] [place] [employer] - Occupational Stress Questionnaire     Feeling of Stress : Not at all   Housing Stability: Low Risk  (2/25/2025)    Housing Stability Vital Sign     Unable to Pay for Housing in the Last Year: No     Homeless in the Last Year: No       Review of patient's allergies indicates:  No Known Allergies   Current Outpatient Medications on File Prior to Visit   Medication Sig Dispense Refill    amLODIPine (NORVASC) 5 MG tablet Take 5 mg by mouth every evening.      ascorbic acid, vitamin C, (VITAMIN C) 1000 MG tablet Take 1,000 mg by mouth once daily.      busPIRone (BUSPAR) 5 MG Tab Take 1 tablet (5 mg total) by mouth 2 (two) times daily. 60 tablet 11    cloNIDine (CATAPRES) 0.1 MG tablet Take 0.1 mg by mouth.      EScitalopram oxalate (LEXAPRO) 5 MG Tab Take 1 tablet (5 mg total) by mouth once daily. 90 tablet 3    metoprolol tartrate (LOPRESSOR) 25 MG tablet Take 1 tablet (25 mg total) by mouth 2 (two) times daily. 180 tablet 3    rosuvastatin (CRESTOR) 5 MG tablet Take 5 mg by mouth every evening.      TAURINE ORAL Take 1 tablet by mouth once daily.      telmisartan (MICARDIS) 80 MG Tab Take 80 mg by mouth once daily.      terbinafine HCL (LAMISIL) 250 mg tablet Take 1 tablet (250 mg total) by mouth once daily. 45 tablet 1     No current facility-administered medications on file prior to visit.      Review of Systems   Constitutional:  Negative for appetite change, fatigue, fever and unexpected weight change.   HENT:  Negative for mouth sores.    Eyes: Negative.    Respiratory:  Negative for cough and shortness of breath.    Cardiovascular:  Negative for chest pain and leg swelling.   Gastrointestinal:  Negative for abdominal distention, abdominal pain, constipation, diarrhea, nausea, vomiting and reflux.   Genitourinary:  Negative for difficulty urinating, dysuria and hematuria.   Musculoskeletal:  Negative for arthralgias and back pain.  "  Integumentary:  Negative for rash.   Neurological:  Negative for weakness and headaches.   Hematological:  Negative for adenopathy.   Psychiatric/Behavioral:  Negative for sleep disturbance. The patient is not nervous/anxious.          Vitals:    06/26/25 1034   BP: (!) 197/79   Pulse: 66   Resp: 18   Temp: 98.1 °F (36.7 °C)   TempSrc: Oral   SpO2: 96%   Weight: 80.6 kg (177 lb 12.8 oz)   Height: 5' 6" (1.676 m)       Physical Exam  Constitutional:       General: He is awake.      Appearance: Normal appearance.   HENT:      Head: Normocephalic and atraumatic.      Nose: Nose normal.      Mouth/Throat:      Mouth: Mucous membranes are moist.   Eyes:      General: Vision grossly intact.      Extraocular Movements: Extraocular movements intact.      Conjunctiva/sclera: Conjunctivae normal.   Cardiovascular:      Rate and Rhythm: Normal rate and regular rhythm.      Heart sounds: Normal heart sounds.   Pulmonary:      Effort: Pulmonary effort is normal.      Breath sounds: Normal breath sounds.   Abdominal:      General: Bowel sounds are normal. There is no distension.      Palpations: Abdomen is soft.      Tenderness: There is no abdominal tenderness.   Musculoskeletal:      Cervical back: Normal range of motion and neck supple.      Right lower leg: No edema.      Left lower leg: No edema.   Lymphadenopathy:      Cervical: No cervical adenopathy.      Upper Body:      Right upper body: No supraclavicular adenopathy.      Left upper body: No supraclavicular adenopathy.   Skin:     General: Skin is warm.   Neurological:      Mental Status: He is alert and oriented to person, place, and time.      Motor: Motor function is intact.   Psychiatric:         Mood and Affect: Mood normal.         Speech: Speech normal.         Behavior: Behavior is cooperative.         Judgment: Judgment normal.     Lab Visit on 06/23/2025   Component Date Value    Sodium 06/23/2025 138     Potassium 06/23/2025 4.3     Chloride 06/23/2025 105 "     CO2 06/23/2025 26     Glucose 06/23/2025 109     Blood Urea Nitrogen 06/23/2025 19.3     Creatinine 06/23/2025 1.17     Calcium 06/23/2025 9.4     Protein Total 06/23/2025 7.3     Albumin 06/23/2025 3.8     Globulin 06/23/2025 3.5     Albumin/Globulin Ratio 06/23/2025 1.1     Bilirubin Total 06/23/2025 0.4     ALP 06/23/2025 74     ALT 06/23/2025 15     AST 06/23/2025 18     eGFR 06/23/2025 >60     Anion Gap 06/23/2025 7.0     BUN/Creatinine Ratio 06/23/2025 16     Lactate Dehydrogenase 06/23/2025 274 (H)     WBC 06/23/2025 14.46 (H)     RBC 06/23/2025 4.35 (L)     Hgb 06/23/2025 13.9 (L)     Hct 06/23/2025 41.3 (L)     MCV 06/23/2025 94.9 (H)     MCH 06/23/2025 32.0 (H)     MCHC 06/23/2025 33.7     RDW 06/23/2025 12.8     Platelet 06/23/2025 199     MPV 06/23/2025 9.8     Neut % 06/23/2025 26.9     Lymph % 06/23/2025 68.0     Mono % 06/23/2025 3.5     Eos % 06/23/2025 1.4     Basophil % 06/23/2025 0.1     Imm Grans % 06/23/2025 0.1     Neut # 06/23/2025 3.88     Lymph # 06/23/2025 9.83 (H)     Mono # 06/23/2025 0.51     Eos # 06/23/2025 0.20     Baso # 06/23/2025 0.02     Imm Gran # 06/23/2025 0.02           Assessment:       1. Chronic lymphocytic leukemia          Plan:       Patient with CLL, Joshua Stage I.  CT soft tissue neck/C/A/P done 8/20/24 shows only lymphadenopathy in left inguinal region.  Remainder of CBC is normal.  He has no B-symptoms.  FISH with 13q deletion and IgHV hypermutation c/w favorable prognosis.    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 Referred to CV surgery and underwent right CEA in December 2024.   Recently underwent Left CEA on 2/24/25 with Dr. Greenwood.   Had peripheral angiogram with stent recently on 06/17/25.       CBC with Stable WBC. Mild anemia noted with Hgb 13.9 g/dL, up from 13.5 at last visit.  MCV and MCH elevated. Normal platelets. .  No indications for any treatment at this time.  Recommend  continued observation.  F/u in 3 months with labs and visit for observation of CLL.   All questions answered at this time.      ISAK Sawant    Visit today included increased complexity associated with the longitudinal care of the patient's chronic CLL.

## 2025-07-02 DIAGNOSIS — B35.1 ONYCHOMYCOSIS: ICD-10-CM

## 2025-07-02 RX ORDER — TERBINAFINE HYDROCHLORIDE 250 MG/1
250 TABLET ORAL DAILY
Qty: 45 TABLET | Refills: 1 | Status: SHIPPED | OUTPATIENT
Start: 2025-07-02 | End: 2025-09-30

## 2025-08-01 ENCOUNTER — CLINICAL SUPPORT (OUTPATIENT)
Dept: PRIMARY CARE CLINIC | Facility: CLINIC | Age: 70
End: 2025-08-01
Payer: MEDICARE

## 2025-08-01 DIAGNOSIS — D72.0 GENETIC ANOMALIES OF LEUKOCYTES: Chronic | ICD-10-CM

## 2025-08-01 DIAGNOSIS — Z00.00 ENCOUNTER FOR ANNUAL WELLNESS EXAM IN MEDICARE PATIENT: Primary | ICD-10-CM

## 2025-08-01 DIAGNOSIS — R79.9 ABNORMAL BLOOD CHEMISTRY: ICD-10-CM

## 2025-08-01 DIAGNOSIS — I10 PRIMARY HYPERTENSION: Chronic | ICD-10-CM

## 2025-08-01 DIAGNOSIS — C91.10 CHRONIC LYMPHOCYTIC LEUKEMIA: Chronic | ICD-10-CM

## 2025-08-01 DIAGNOSIS — E78.5 DYSLIPIDEMIA: ICD-10-CM

## 2025-08-01 DIAGNOSIS — D72.820 LYMPHOCYTOSIS: ICD-10-CM

## 2025-08-01 LAB
ABS NEUT (OLG): 3.9 X10(3)/MCL (ref 2.1–9.2)
ALBUMIN SERPL-MCNC: 4.2 G/DL (ref 3.4–4.8)
ALBUMIN/GLOB SERPL: 1.4 RATIO (ref 1.1–2)
ALP SERPL-CCNC: 77 UNIT/L (ref 40–150)
ALT SERPL-CCNC: 17 UNIT/L (ref 0–55)
ANION GAP SERPL CALC-SCNC: 8 MEQ/L
AST SERPL-CCNC: 22 UNIT/L (ref 11–45)
BILIRUB SERPL-MCNC: 0.7 MG/DL
BUN SERPL-MCNC: 19.4 MG/DL (ref 8.4–25.7)
CALCIUM SERPL-MCNC: 9.4 MG/DL (ref 8.8–10)
CHLORIDE SERPL-SCNC: 106 MMOL/L (ref 98–107)
CHOLEST SERPL-MCNC: 168 MG/DL
CHOLEST/HDLC SERPL: 4 {RATIO} (ref 0–5)
CO2 SERPL-SCNC: 27 MMOL/L (ref 23–31)
CREAT SERPL-MCNC: 1.01 MG/DL (ref 0.72–1.25)
CREAT/UREA NIT SERPL: 19
ERYTHROCYTE [DISTWIDTH] IN BLOOD BY AUTOMATED COUNT: 12.8 % (ref 11.5–17)
EST. AVERAGE GLUCOSE BLD GHB EST-MCNC: 111.2 MG/DL
GFR SERPLBLD CREATININE-BSD FMLA CKD-EPI: >60 ML/MIN/1.73/M2
GLOBULIN SER-MCNC: 3 GM/DL (ref 2.4–3.5)
GLUCOSE SERPL-MCNC: 118 MG/DL (ref 82–115)
HBA1C MFR BLD: 5.5 %
HCT VFR BLD AUTO: 43 % (ref 42–52)
HDLC SERPL-MCNC: 48 MG/DL (ref 35–60)
HGB BLD-MCNC: 14.7 G/DL (ref 14–18)
INSTRUMENT WBC (OLG): 15.61 X10(3)/MCL
LDLC SERPL CALC-MCNC: 100 MG/DL (ref 50–140)
LYMPHOCYTES NFR BLD MANUAL: 11.71 X10(3)/MCL (ref 0.6–4.6)
LYMPHOCYTES NFR BLD MANUAL: 75 %
MCH RBC QN AUTO: 31.8 PG (ref 27–31)
MCHC RBC AUTO-ENTMCNC: 34.2 G/DL (ref 33–36)
MCV RBC AUTO: 93.1 FL (ref 80–94)
NEUTROPHILS NFR BLD MANUAL: 25 %
PLATELET # BLD AUTO: 183 X10(3)/MCL (ref 130–400)
PLATELET # BLD EST: NORMAL 10*3/UL
PMV BLD AUTO: 10.1 FL (ref 7.4–10.4)
POIKILOCYTOSIS BLD QL SMEAR: SLIGHT
POTASSIUM SERPL-SCNC: 4.5 MMOL/L (ref 3.5–5.1)
PROT SERPL-MCNC: 7.2 GM/DL (ref 5.8–7.6)
RBC # BLD AUTO: 4.62 X10(6)/MCL (ref 4.7–6.1)
RBC MORPH BLD: ABNORMAL
SODIUM SERPL-SCNC: 141 MMOL/L (ref 136–145)
TRIGL SERPL-MCNC: 98 MG/DL (ref 34–140)
TSH SERPL-ACNC: 1.46 UIU/ML (ref 0.35–4.94)
VLDLC SERPL CALC-MCNC: 20 MG/DL
WBC # BLD AUTO: 15.61 X10(3)/MCL (ref 4.5–11.5)

## 2025-08-01 PROCEDURE — 80053 COMPREHEN METABOLIC PANEL: CPT | Performed by: GENERAL PRACTICE

## 2025-08-01 PROCEDURE — 85025 COMPLETE CBC W/AUTO DIFF WBC: CPT | Performed by: GENERAL PRACTICE

## 2025-08-01 PROCEDURE — 84443 ASSAY THYROID STIM HORMONE: CPT | Performed by: GENERAL PRACTICE

## 2025-08-01 PROCEDURE — 83036 HEMOGLOBIN GLYCOSYLATED A1C: CPT | Performed by: GENERAL PRACTICE

## 2025-08-01 PROCEDURE — 80061 LIPID PANEL: CPT | Performed by: GENERAL PRACTICE

## 2025-08-02 NOTE — PROGRESS NOTES
"  Patient ID: [id number]     Chief Complaint: Annual Exam      HPI:     Jd Nelson is a 70 y.o. male here today for a Medicare Wellness. No other complaints today.       -------------------------------------    Bilateral carotid artery stenosis    CLL (chronic lymphocytic leukemia)    Dyslipidemia    GERD (gastroesophageal reflux disease)    Hyperlipidemia    Irritable bowel syndrome    Mediastinal mass    Primary hypertension    Retina disorder    "clot"    Subclavian artery stenosis, left        Past Surgical History:   Procedure Laterality Date    CAROTID ENDARTERECTOMY Right 11/26/2024    Procedure: ENDARTERECTOMY-CAROTID;  Surgeon: Cherise Greenwood MD;  Location: [facility];  Service: Cardiology;  Laterality: Right;    CAROTID ENDARTERECTOMY Left 02/24/2025    Procedure: ENDARTERECTOMY-CAROTID;  Surgeon: Cherise Greenwood MD;  Location: [facility];  Service: Cardiology;  Laterality: Left;    COLONOSCOPY      PLACEMENT-STENT  06/17/2025    WISDOM TOOTH EXTRACTION         Review of patient's allergies indicates:  No Known Allergies    No outpatient medications have been marked as taking for the 8/4/25 encounter (Office Visit) with Galo Hussein MD.       Social History[1]     Family History   Problem Relation Name Age of Onset    Asthma Sister Sister     Diabetes Brother Brother     Cancer Father Father         Patient Care Team:  Galo Hussein MD as PCP - General (Family Medicine)  Joao St MD as Consulting Physician (Urology)  Enoc Gaytan MD as Consulting Physician (Gastroenterology)  Karly Hernandez MD as Consulting Physician (Oncology)  Cherise Greenwood MD as Consulting Physician (Cardiothoracic Surgery)     Opioid Screening: Patient medication list reviewed, patient is not taking prescription opioids. Patient is at low risk of substance abuse based on this opioid use history.     Subjective:     Review of Systems   Constitutional: Negative.  Negative for malaise/fatigue and " "weight loss.   HENT: Negative.     Eyes: Negative.    Respiratory: Negative.  Negative for shortness of breath.    Cardiovascular: Negative.  Negative for chest pain.   Gastrointestinal: Negative.    Genitourinary: Negative.    Musculoskeletal: Negative.    Skin: Negative.    Neurological: Negative.  Negative for focal weakness, weakness and headaches.   Endo/Heme/Allergies: Negative.    Psychiatric/Behavioral: Negative.  Negative for depression and memory loss. The patient is not nervous/anxious.          Patient Reported Health Risk Assessment       Objective:     BP (!) 179/79 (Patient Position: Sitting)   Pulse 69   Ht 5' 6" (1.676 m)   Wt 79.1 kg (174 lb 6.4 oz)   BMI 28.15 kg/m²     Physical Exam  Constitutional:       Appearance: Normal appearance.   HENT:      Head: Normocephalic.      Mouth/Throat:      Mouth: Mucous membranes are moist.      Pharynx: Oropharynx is clear.   Eyes:      Conjunctiva/sclera: Conjunctivae normal.      Pupils: Pupils are equal, round, and reactive to light.   Cardiovascular:      Rate and Rhythm: Normal rate and regular rhythm.      Heart sounds: Normal heart sounds.   Pulmonary:      Effort: Pulmonary effort is normal.      Breath sounds: Normal breath sounds.   Abdominal:      General: Abdomen is flat.      Palpations: Abdomen is soft.   Musculoskeletal:         General: Normal range of motion.      Cervical back: Neck supple.   Skin:     General: Skin is warm and dry.   Neurological:      General: No focal deficit present.      Mental Status: He is alert and oriented to person, place, and time.   Psychiatric:         Mood and Affect: Mood normal.         Behavior: Behavior normal.         Thought Content: Thought content normal.         Judgment: Judgment normal.         Lab Results   Component Value Date     (H) 08/01/2025     08/01/2025    K 4.5 08/01/2025     08/01/2025    CO2 27 08/01/2025    BUN 19.4 08/01/2025    CREATININE 1.01 08/01/2025    " CALCIUM 9.4 08/01/2025    PROT 7.2 08/01/2025    ALBUMIN 4.2 08/01/2025    BILITOT 0.7 08/01/2025    ALKPHOS 77 08/01/2025    AST 22 08/01/2025    ALT 17 08/01/2025    EGFRNORACEVR >60 08/01/2025     Lab Results   Component Value Date    WBC 15.61 (H) 08/01/2025    WBC 15.61 08/01/2025    RBC 4.62 (L) 08/01/2025    HGB 14.7 08/01/2025    HCT 43.0 08/01/2025    MCV 93.1 08/01/2025    RDW 12.8 08/01/2025     08/01/2025      Lab Results   Component Value Date    CHOL 168 08/01/2025    TRIG 98 08/01/2025    HDL 48 08/01/2025    TOTALCHOLEST 4 08/01/2025     Lab Results   Component Value Date    TSH 1.459 08/01/2025     Lab Results   Component Value Date    HGBA1C 5.5 08/01/2025        Lab Results   Component Value Date    PSA 11.55 (H) 04/18/2019              No data to display                  8/4/2025     9:20 AM 6/26/2025    10:30 AM 4/9/2025     2:30 PM 4/2/2025    11:40 AM 3/10/2025     1:30 PM 1/8/2025     8:10 AM 12/5/2024     9:40 AM   Fall Risk Assessment - Outpatient   Mobility Status Ambulatory Ambulatory Ambulatory Ambulatory Ambulatory Ambulatory Ambulatory   Number of falls 0 0 0 0 0 0 0   Identified as fall risk False False False False False False False              Assessment:       ICD-10-CM ICD-9-CM   1. Medicare annual wellness visit, subsequent  Z00.00 V70.0   2. Screening for prostate cancer  Z12.5 V76.44   3. Elevated PSA, between 10 and less than 20 ng/ml  R97.20 790.93   4. Primary hypertension  I10 401.9   5. Dyslipidemia  E78.5 272.4   6. ANJANA (generalized anxiety disorder)  F41.1 300.02   7. Atherosclerosis of subclavian artery  I70.8 440.8   8. Chronic lymphocytic leukemia  C91.10 204.10   9. Borderline hyperglycemia  R73.9 790.29        Plan:     Medicare Annual Wellness and Personalized Prevention Plan:   Fall Risk + Home Safety + Hearing Impairment + Depression Screen + Cognitive Impairment Screen + Health Risk Assessment all reviewed.       Health Maintenance Topics with due status:  Not Due       Topic Last Completion Date    Colorectal Cancer Screening 10/20/2022    High Dose Statin 06/26/2025    Hemoglobin A1c (Diabetic Prevention Screening) 08/01/2025    Lipid Panel 08/01/2025    Influenza Vaccine Not Due      The patient's Health Maintenance was reviewed and the following appears to be due at this time:   Health Maintenance Due   Topic Date Due    TETANUS VACCINE  Never done    Shingles Vaccine (1 of 2) Never done    Pneumococcal Vaccines (Age 50+) (1 of 2 - PCV) Never done     Health risk assessment:  After review of the patient's medical record as it relates to health risk assessment over the next 5-10 years per recommendations of the USPSTF, it was determined that all pertinent recommendations have been appropriately addressed. The patient does not desire any further preventative care measures or screening tests at this time.  We will continue to reassess at each annual visit.    1. Medicare annual wellness visit, subsequent  Comments:  Overall health status was reviewed.  Good health habits reinforced.  Annual wellness exams recommended.    2. Screening for prostate cancer  Comments:  Sees Dr. St, urology for prostate cancer screening.    3. Elevated PSA, between 10 and less than 20 ng/ml  Overview:  Sees Urology, Dr. St.      4. Primary hypertension  Overview:  Prescribed telmisartan 40 mg daily.    Blood pressures appear to be adequately controlled with current treatment plan, including prescription blood pressure medication.  Medication(s) appear to be effective at current dosages, and are not causing any side effects or problems.  Continue medical management and continue home blood pressure checks.  Average blood pressures at home should be no greater than 130/80.  Patient instructed to contact the clinic if blood pressures become elevated at any point prior to next clinic visit.    Medication will be continued at the current dosage.    Orders:  -     Comprehensive  Metabolic Panel; Future; Expected date: 08/02/2026  -     CBC Auto Differential; Future; Expected date: 08/02/2026  -     TSH; Future; Expected date: 08/02/2026    5. Dyslipidemia  Overview:  Prescribed Crestor 5 mg daily.    Most recent cholesterol/lipid blood testing shows adequate control, continue current treatment plan, including prescription medications if prescribed, and/or diet high in fiber, low in saturated fats as discussed during clinic visit.    Medication will be continued at current dosage.    Orders:  -     Lipid Panel; Future; Expected date: 08/02/2026    6. ANJANA (generalized anxiety disorder)  Overview:  Prescribed Lexapro 5 mg daily.     Patient reports stability of moods, and effectiveness of medications, including no agitation, significant somnolence, or significant bouts of anxiety or depression.  Patient is not having any sleep disturbance.  Current treatment plan will continue, with plans to discuss any significant changes in patient's status if necessary if anything changes in the future..    Medication will be continued at current dosage.      7. Atherosclerosis of subclavian artery  Overview:  Has left-sided subclavian atherosclerosis causing decreased blood pressures in his left arm.  Does see Dr. Taylor, cardiology.      8. Chronic lymphocytic leukemia  Overview:  Recently diagnosed with CLL, August 2024, seeing Hematology/Oncology, Dr. Karly Hernandez.      9. Borderline hyperglycemia  Overview:  Patient has had elevated blood sugars in the past without overt prediabetes or diabetes, with previous A1c levels at 5.5-5.6%.  Since patient is at risk for developing prediabetes or diabetes, we will check A1c levels annually.    Orders:  -     Hemoglobin A1C; Future; Expected date: 08/02/2026            Advance Care Planning     Date: 08/02/2025  During this visit, I engaged the patient  in the voluntary advance care planning process.  The patient and I reviewed the role for advance directives and  their purpose in directing future healthcare if the patient's unable to speak for him/herself.  At this point in time, the patient does have full decision-making capacity.  We discussed different extreme health states that she could experience, and reviewed what kind of medical care she would want in those situations.  The patient communicated that if she were comatose and had little chance of a meaningful recovery, she would not want machines/life-sustaining treatments used. In addition to the above preference, other important end-of-life issues for the patient include no other issues.  Advanced care planning paperwork offered to the patient to bring home and discuss with the family.  If signed, patient should bring form back for for the purposes of entering it into the medical record.  I spent a total of 5 minutes engaging the patient in this advance care planning discussion.        Current Outpatient Medications   Medication Instructions    amLODIPine (NORVASC) 5 mg, Nightly    ascorbic acid (vitamin C) (VITAMIN C) 1,000 mg, Daily    cloNIDine (CATAPRES) 0.1 mg    EScitalopram oxalate (LEXAPRO) 5 mg, Oral, Daily    metoprolol tartrate (LOPRESSOR) 25 mg, Oral, 2 times daily    rosuvastatin (CRESTOR) 5 mg, Nightly    TAURINE ORAL 1 tablet, Daily    telmisartan (MICARDIS) 80 mg, Daily    terbinafine HCL (LAMISIL) 250 mg, Oral, Daily        Follow up in about 6 months (around 2/4/2026) for Chronic condition F/up and Wellness. In addition to their scheduled follow up, the patient has also been instructed to follow up on as needed basis.     Patient's visits will be scheduled through the next wellness in one year.    This note was created with the assistance of [employer] voice recognition software or phone dictation. There may be transcription errors as a result of using this technology. Effort has been made to assure accuracy of transcription but any obvious errors or omissions should be clarified with the author of the  document.         [1]   Social History  Socioeconomic History    Marital status:    Tobacco Use    Smoking status: Never    Smokeless tobacco: Never   Substance and Sexual Activity    Alcohol use: Not Currently    Drug use: Not Currently    Sexual activity: Not Currently     Social Drivers of Health     Financial Resource Strain: Low Risk  (2/25/2025)    Overall Financial Resource Strain (CARDIA)     Difficulty of Paying Living Expenses: Not very hard   Food Insecurity: No Food Insecurity (2/25/2025)    Hunger Vital Sign     Worried About Running Out of Food in the Last Year: Never true     Ran Out of Food in the Last Year: Never true   Transportation Needs: No Transportation Needs (11/26/2024)    TRANSPORTATION NEEDS     Transportation : No   Physical Activity: Inactive (2/25/2025)    Exercise Vital Sign     Days of Exercise per Week: 0 days     Minutes of Exercise per Session: 0 min   Stress: No Stress Concern Present (2/25/2025)    [employer] [employer] - Occupational Stress Questionnaire     Feeling of Stress : Not at all   Housing Stability: Low Risk  (2/25/2025)    Housing Stability Vital Sign     Unable to Pay for Housing in the Last Year: No     Homeless in the Last Year: No

## 2025-08-04 ENCOUNTER — OFFICE VISIT (OUTPATIENT)
Dept: PRIMARY CARE CLINIC | Facility: CLINIC | Age: 70
End: 2025-08-04
Payer: MEDICARE

## 2025-08-04 VITALS
SYSTOLIC BLOOD PRESSURE: 179 MMHG | WEIGHT: 174.38 LBS | DIASTOLIC BLOOD PRESSURE: 79 MMHG | HEART RATE: 69 BPM | BODY MASS INDEX: 28.03 KG/M2 | HEIGHT: 66 IN

## 2025-08-04 DIAGNOSIS — R97.20 ELEVATED PSA, BETWEEN 10 AND LESS THAN 20 NG/ML: Chronic | ICD-10-CM

## 2025-08-04 DIAGNOSIS — Z00.00 MEDICARE ANNUAL WELLNESS VISIT, SUBSEQUENT: Primary | ICD-10-CM

## 2025-08-04 DIAGNOSIS — I10 PRIMARY HYPERTENSION: Chronic | ICD-10-CM

## 2025-08-04 DIAGNOSIS — C91.10 CHRONIC LYMPHOCYTIC LEUKEMIA: Chronic | ICD-10-CM

## 2025-08-04 DIAGNOSIS — F41.1 GAD (GENERALIZED ANXIETY DISORDER): Chronic | ICD-10-CM

## 2025-08-04 DIAGNOSIS — I70.8: Chronic | ICD-10-CM

## 2025-08-04 DIAGNOSIS — Z12.5 SCREENING FOR PROSTATE CANCER: ICD-10-CM

## 2025-08-04 DIAGNOSIS — R73.9 BORDERLINE HYPERGLYCEMIA: ICD-10-CM

## 2025-08-04 DIAGNOSIS — E78.5 DYSLIPIDEMIA: Chronic | ICD-10-CM

## 2025-08-04 PROCEDURE — G0439 PPPS, SUBSEQ VISIT: HCPCS | Mod: ,,, | Performed by: GENERAL PRACTICE

## 2025-08-06 ENCOUNTER — PATIENT OUTREACH (OUTPATIENT)
Facility: CLINIC | Age: 70
End: 2025-08-06
Payer: MEDICARE

## 2025-08-06 NOTE — PROGRESS NOTES
Value base Outreach  No answer,left message for call back to discuss overdue Health maintenance Topic.  Portal message sent.

## (undated) DEVICE — APPLIER LIGACLIP SM 9.38IN

## (undated) DEVICE — ELECTRODE PATIENT RETURN DISP

## (undated) DEVICE — GLOVE PROTEXIS HYDROGEL SZ6.5

## (undated) DEVICE — GLOVE PROTEXIS NEOPRN SZ8

## (undated) DEVICE — DRAPE INCISE IOBAN 2 13X13IN

## (undated) DEVICE — SOL NORMAL USPCA 0.9%

## (undated) DEVICE — GLOVE PROTEXIS BLUE LATEX 7

## (undated) DEVICE — SUT PROLENE 6-0 BV-1 30IN

## (undated) DEVICE — KIT SURGICAL TURNOVER

## (undated) DEVICE — NDL HYPO A BEVEL 30X1/2

## (undated) DEVICE — Device

## (undated) DEVICE — BAG MEDI-PLAST DECANTER C-FLOW

## (undated) DEVICE — SUT MONOCRYL PLUS UD 3-0 27

## (undated) DEVICE — GLOVE PROTEXIS PI SYN SURG 7.5

## (undated) DEVICE — SUT VICRYL 2-0 36 CT-1

## (undated) DEVICE — GLOVE SURG BIOGEL LATEX SZ 7.5

## (undated) DEVICE — TUBE SUCTION MEDI-VAC STERILE

## (undated) DEVICE — GOWN SMARTSLEEVE AAMI LVL4 XXL

## (undated) DEVICE — PENCIL ELECSURG ROCKER 15FT

## (undated) DEVICE — PAD N ADH STRL 2X3IN

## (undated) DEVICE — DRESSING TRANS 4X4 TEGADERM

## (undated) DEVICE — WIPE MERCL POLYVIL ACETL 3X3IN

## (undated) DEVICE — TUBE SUCTION 6.5 TIP 6FR

## (undated) DEVICE — CLIP LIGATING MEDIUM